# Patient Record
Sex: MALE | Race: WHITE | NOT HISPANIC OR LATINO | Employment: UNEMPLOYED | URBAN - METROPOLITAN AREA
[De-identification: names, ages, dates, MRNs, and addresses within clinical notes are randomized per-mention and may not be internally consistent; named-entity substitution may affect disease eponyms.]

---

## 2022-11-07 ENCOUNTER — HOSPITAL ENCOUNTER (EMERGENCY)
Facility: CLINIC | Age: 47
Discharge: HOME | End: 2022-11-07
Attending: EMERGENCY MEDICINE | Admitting: EMERGENCY MEDICINE
Payer: COMMERCIAL

## 2022-11-07 ENCOUNTER — APPOINTMENT (OUTPATIENT)
Dept: GENERAL RADIOLOGY | Facility: CLINIC | Age: 47
End: 2022-11-07
Attending: EMERGENCY MEDICINE
Payer: COMMERCIAL

## 2022-11-07 VITALS
TEMPERATURE: 98.8 F | RESPIRATION RATE: 20 BRPM | DIASTOLIC BLOOD PRESSURE: 93 MMHG | OXYGEN SATURATION: 96 % | HEART RATE: 64 BPM | WEIGHT: 262.13 LBS | SYSTOLIC BLOOD PRESSURE: 135 MMHG

## 2022-11-07 DIAGNOSIS — R07.9 CHEST PAIN, UNSPECIFIED TYPE: ICD-10-CM

## 2022-11-07 LAB
ANION GAP SERPL CALCULATED.3IONS-SCNC: 9 MMOL/L (ref 7–15)
BASOPHILS # BLD AUTO: 0.1 10E3/UL (ref 0–0.2)
BASOPHILS NFR BLD AUTO: 1 %
BUN SERPL-MCNC: 17.7 MG/DL (ref 6–20)
CALCIUM SERPL-MCNC: 9.2 MG/DL (ref 8.6–10)
CHLORIDE SERPL-SCNC: 105 MMOL/L (ref 98–107)
CREAT SERPL-MCNC: 0.98 MG/DL (ref 0.67–1.17)
DEPRECATED HCO3 PLAS-SCNC: 25 MMOL/L (ref 22–29)
EOSINOPHIL # BLD AUTO: 0.1 10E3/UL (ref 0–0.7)
EOSINOPHIL NFR BLD AUTO: 2 %
ERYTHROCYTE [DISTWIDTH] IN BLOOD BY AUTOMATED COUNT: 13.4 % (ref 10–15)
GFR SERPL CREATININE-BSD FRML MDRD: >90 ML/MIN/1.73M2
GLUCOSE SERPL-MCNC: 89 MG/DL (ref 70–99)
HCT VFR BLD AUTO: 52.1 % (ref 40–53)
HGB BLD-MCNC: 17.2 G/DL (ref 13.3–17.7)
HOLD SPECIMEN: NORMAL
HOLD SPECIMEN: NORMAL
IMM GRANULOCYTES # BLD: 0 10E3/UL
IMM GRANULOCYTES NFR BLD: 0 %
LYMPHOCYTES # BLD AUTO: 2.3 10E3/UL (ref 0.8–5.3)
LYMPHOCYTES NFR BLD AUTO: 30 %
MCH RBC QN AUTO: 28.3 PG (ref 26.5–33)
MCHC RBC AUTO-ENTMCNC: 33 G/DL (ref 31.5–36.5)
MCV RBC AUTO: 86 FL (ref 78–100)
MONOCYTES # BLD AUTO: 0.8 10E3/UL (ref 0–1.3)
MONOCYTES NFR BLD AUTO: 10 %
NEUTROPHILS # BLD AUTO: 4.5 10E3/UL (ref 1.6–8.3)
NEUTROPHILS NFR BLD AUTO: 57 %
NRBC # BLD AUTO: 0 10E3/UL
NRBC BLD AUTO-RTO: 0 /100
PLATELET # BLD AUTO: 237 10E3/UL (ref 150–450)
POTASSIUM SERPL-SCNC: 4.3 MMOL/L (ref 3.4–5.3)
RBC # BLD AUTO: 6.07 10E6/UL (ref 4.4–5.9)
SODIUM SERPL-SCNC: 139 MMOL/L (ref 136–145)
TROPONIN T SERPL HS-MCNC: <6 NG/L
WBC # BLD AUTO: 7.8 10E3/UL (ref 4–11)

## 2022-11-07 PROCEDURE — 250N000009 HC RX 250: Performed by: EMERGENCY MEDICINE

## 2022-11-07 PROCEDURE — 85025 COMPLETE CBC W/AUTO DIFF WBC: CPT | Performed by: EMERGENCY MEDICINE

## 2022-11-07 PROCEDURE — 250N000013 HC RX MED GY IP 250 OP 250 PS 637: Performed by: EMERGENCY MEDICINE

## 2022-11-07 PROCEDURE — 93005 ELECTROCARDIOGRAM TRACING: CPT

## 2022-11-07 PROCEDURE — 80048 BASIC METABOLIC PNL TOTAL CA: CPT | Performed by: EMERGENCY MEDICINE

## 2022-11-07 PROCEDURE — 99285 EMERGENCY DEPT VISIT HI MDM: CPT | Mod: 25

## 2022-11-07 PROCEDURE — 84484 ASSAY OF TROPONIN QUANT: CPT | Performed by: EMERGENCY MEDICINE

## 2022-11-07 PROCEDURE — 71046 X-RAY EXAM CHEST 2 VIEWS: CPT

## 2022-11-07 PROCEDURE — 36415 COLL VENOUS BLD VENIPUNCTURE: CPT | Performed by: EMERGENCY MEDICINE

## 2022-11-07 RX ADMIN — LIDOCAINE HYDROCHLORIDE 30 ML: 20 SOLUTION ORAL; TOPICAL at 13:08

## 2022-11-07 ASSESSMENT — ENCOUNTER SYMPTOMS
NERVOUS/ANXIOUS: 1
VOMITING: 0
DIAPHORESIS: 1
LIGHT-HEADEDNESS: 1
CHEST TIGHTNESS: 1
DIARRHEA: 0
NAUSEA: 0
FEVER: 0
SHORTNESS OF BREATH: 1

## 2022-11-07 ASSESSMENT — ACTIVITIES OF DAILY LIVING (ADL): ADLS_ACUITY_SCORE: 35

## 2022-11-07 NOTE — ED PROVIDER NOTES
History   Chief Complaint:  Chest Pain       The history is provided by the patient.      Jose Tovar is a 46 year old male who presents with chest tightness for the past week with some associated shortness of breath and lightheadedness. He says the pain is intermittent. He claims that he has never felt this sensation before so he was worried about what was going on. He says that he gets tingling in the left arm that seems to come with the intermittent chest tightness episodes. Says that he has been pretty lazy the last week because he gets some shortness of breath with exertion, however is usually very active. Reports some diaphoresis. Reports his wife was in the hospital over the weekend for a skin infection and was curious if this could have spread to him somehow. No history of diabetes, cholesterol, or Hypertension. No tobacco use. No recent travel or lower extremity edema. He feels that cold water makes his pain better and says that many people in his family have GERD, but he just wanted to be sure it wasn't something worse. He feels highly anxious about his symptoms. The patient denies, nausea, vomiting, diarrhea, or fever.     Review of Systems   Constitutional: Positive for diaphoresis. Negative for fever.   Respiratory: Positive for chest tightness and shortness of breath.    Cardiovascular: Negative for leg swelling.   Gastrointestinal: Negative for diarrhea, nausea and vomiting.   Neurological: Positive for light-headedness.   Psychiatric/Behavioral: The patient is nervous/anxious.    All other systems reviewed and are negative.    Allergies:  Bees     Medications:  Epipen     Past Medical History:     Hemoptysis     Past Surgical History:    Vasectomy    Family History:    Prostate cancer  Ovarian cancer    Social History:  The patient presents to the ED alone.     Physical Exam     Patient Vitals for the past 24 hrs:   BP Temp Temp src Pulse Resp SpO2 Weight   11/07/22 1400 (!) 134/92 -- -- 67 --  98 % --   11/07/22 1345 118/84 -- -- 68 -- 97 % --   11/07/22 1115 -- -- -- -- -- -- 118.9 kg (262 lb 2 oz)   11/07/22 1114 (!) 162/115 98.8  F (37.1  C) Temporal 97 20 98 % --   11/07/22 1113 -- 98.4  F (36.9  C) Temporal -- 18 -- --     Physical Exam  General: Alert, no acute distress  HEENT:  Moist mucous membranes.  Conjunctiva normal.  CV:  RRR, no m/r/g, skin warm and well perfused  Pulm:  CTAB, no wheezes/ronchi/rales.  No acute distress, breathing comfortably  GI:  Soft, nontender, nondistended.  No rebound or guarding.    MSK:  Moving all extremities.  No focal areas of edema, erythema  Skin:  WWP, no rashes, no lower extremity edema, skin color normal, no diaphoresis  Psych:  Well-appearing, normal affect, regular speech    Emergency Department Course   ECG  ECG results from 11/07/22   EKG 12 lead     Value    Systolic Blood Pressure     Diastolic Blood Pressure     Ventricular Rate 85    Atrial Rate 85    MN Interval 138    QRS Duration 96        QTc 452    P Axis 55    R AXIS 32    T Axis 29    Interpretation ECG      Sinus rhythm  Inferior infarct , age undetermined  Abnormal ECG  No previous ECGs available         Imaging:  Chest XR,  PA & LAT   Final Result   IMPRESSION: Negative chest.      CEM BROOKS MD            SYSTEM ID:  [id number]        Report per radiology    Laboratory:  Labs Ordered and Resulted from Time of ED Arrival to Time of ED Departure   CBC WITH PLATELETS AND DIFFERENTIAL - Abnormal       Result Value    WBC Count 7.8      RBC Count 6.07 (*)     Hemoglobin 17.2      Hematocrit 52.1      MCV 86      MCH 28.3      MCHC 33.0      RDW 13.4      Platelet Count 237      % Neutrophils 57      % Lymphocytes 30      % Monocytes 10      % Eosinophils 2      % Basophils 1      % Immature Granulocytes 0      NRBCs per 100 WBC 0      Absolute Neutrophils 4.5      Absolute Lymphocytes 2.3      Absolute Monocytes 0.8      Absolute Eosinophils 0.1      Absolute Basophils 0.1       Absolute Immature Granulocytes 0.0      Absolute NRBCs 0.0     BASIC METABOLIC PANEL - Normal    Sodium 139      Potassium 4.3      Chloride 105      Carbon Dioxide (CO2) 25      Anion Gap 9      Urea Nitrogen 17.7      Creatinine 0.98      Calcium 9.2      Glucose 89      GFR Estimate >90     TROPONIN T, HIGH SENSITIVITY - Normal    Troponin T, High Sensitivity <6        Emergency Department Course:    Reviewed:  I reviewed nursing notes, vitals, past medical history and Care Everywhere    Assessments:  1419 I obtained history and examined the patient as noted above.     Interventions:  Medications   lidocaine (viscous) (XYLOCAINE) 2 % 15 mL, alum & mag hydroxide-simethicone (MAALOX) 15 mL GI Cocktail (30 mLs Oral Given 22 1308)     Disposition:  The patient was discharged to home.     Impression & Plan     Medical Decision Makin-year-old female presenting to the ER for evaluation of intermittent chest tightness for the last week.  Please above for detailed HPI and exam.  Patient arrives afebrile vitally stable.  Broad differentials considered including ACS, PE, acute or dissection, pneumothorax, pneumonia, pleural effusion, GI etiology, musculoskeletal etiology amongst other things.  Fortunately work-up here in the ER is reassuring.  EKG shows no signs of acute ischemic changes or signs of pericarditis.  High-sensitivity troponin is undetectable ruling out ACS.  Patient's HEART score is low.  Likewise, patient is low risk for PE and is PERC negative making this unlikely.  Chest x-ray shows no acute findings.  Doubt acute or dissection based on history exam.  The rest of the patient's lab studies are unremarkable.  Patient did have some relief with GI cocktail suggestive of possible GI etiology such as esophagitis, gastritis, GERD as cause for symptoms.  Given his overall reassuring work-up and improvement of symptoms, I do feel that he is safe to discharge home.  He will follow-up closely with PCP as he  has an appointment next week.  Discussed reasons to return to the ER.  All questions answered prior to discharge    Diagnosis:    ICD-10-CM    1. Chest pain, unspecified type  R07.9           Scribe Disclosure:  I, Alvaro Medeiros, am serving as a scribe at 2:19 PM on 11/7/2022 to document services personally performed by Kevin Farah MD based on my observations and the provider's statements to me.              Kevin Farah MD  11/07/22 2978

## 2022-11-07 NOTE — ED NOTES
Rapid Assessment Note    History:   Jose Tovar is a 46 year old male who presents with chest tightness for the past week some associated shortness of breath and lightheadedness. He says the pain is intermittent. He says that he has never felt this sensation before so he was worried about what was going on. He says that he gets tingling in the left arm that seems to come with the intermittent chest tightness episodes. Says that he has been pretty lazy the last week because he gets some shortness of breath, however is usually very active. Reports some diaphoresis. Says his wife was in the hospital over the weekend for a skin infection. No history of diabetes, cholesterol, Hypertension. No tobacco use. No recent travel or lower extremity edema. He feels that cold water makes his pain better and says that many people in his family have GERD, but he just wanted to be sure it wasn't something worse. He feels highly anxious about his symptoms.     Exam:   General:  Alert, interactive  Cardiovascular:  Well perfused; RRR, no m/r/g  Lungs:  No respiratory distress, no accessory muscle use; CTAB, no wheezing/ronchi/rales.   Neuro:  Moving all 4 extremities  Skin:  Warm, dry  Psych:  Normal affect      Plan of Care:   I evaluated the patient and developed an initial plan of care. I discussed this plan and explained that I, or one of my partners, would be returning to complete the evaluation.     46-year-old male presenting to the ER for evaluation of substernal intermittent chest tightness for the last week.  No aggravating or relieving factors.  No prior history of CAD.  Low risk for PE and is PERC negative.  EKG, labs, chest x-ray ordered.    I, Alvaro Medeiros, am serving as a scribe to document services personally performed by Kevin Farah MD , based on my observations and the provider's statements to me.    11/7/2022  [facility] [employer] [facility]    Portions of this medical record were  completed by a scribe. UPON MY REVIEW AND AUTHENTICATION BY ELECTRONIC SIGNATURE, this confirms (a) I performed the applicable clinical services, and (b) the record is accurate.        Kevin Farah MD  11/07/22 0974

## 2022-11-07 NOTE — ED TRIAGE NOTES
Patient c/o chest pain, tightness in chest and lightheadedness. Has been going on for the past week. Pain comes and goes. States every once in a while left arm goes numb with tingling on left side of face. Rates chest pain 2/10 right now. Worsens at night. Denies any cardiac hx.  ABCs intact.

## 2022-11-07 NOTE — DISCHARGE INSTRUCTIONS
Discharge Instructions  Chest Pain    You have been seen today for chest pain or discomfort.  At this time, your provider has found no signs that your chest pain is due to a serious or life-threatening condition, (or you have declined more testing and/or admission to the hospital). However, sometimes there is a serious problem that does not show up right away. Your evaluation today may not be complete and you may need further testing and evaluation.     Generally, every Emergency Department visit should have a follow-up clinic visit with either a primary or a specialty clinic/provider. Please follow-up as instructed by your emergency provider today.  Return to the Emergency Department if:  Your chest pain changes, gets worse, starts to happen more often, or comes with less activity.  You are newly short of breath.  You get very weak or tired.  You pass out or faint.  You have any new symptoms, like fever, cough, numb legs, or you cough up blood.  You have anything else that worries you.    Until you follow-up with your regular provider, please do the following:  If a stress test appointment has been made, go to the appointment.  If you have questions, contact your regular provider.  Follow-up with your regular provider/clinic as directed; this is very important.    If you were given a prescription for medicine here today, be sure to read all of the information (including the package insert) that comes with your prescription.  This will include important information about the medicine, its side effects, and any warnings that you need to know about.  The pharmacist who fills the prescription can provide more information and answer questions you may have about the medicine.  If you have questions or concerns that the pharmacist cannot address, please call or return to the Emergency Department.       Remember that you can always come back to the Emergency Department if you are not able to see your regular provider in  the amount of time listed above, if you get any new symptoms, or if there is anything that worries you.

## 2022-11-08 LAB
ATRIAL RATE - MUSE: 85 BPM
DIASTOLIC BLOOD PRESSURE - MUSE: NORMAL MMHG
INTERPRETATION ECG - MUSE: NORMAL
P AXIS - MUSE: 55 DEGREES
PR INTERVAL - MUSE: 138 MS
QRS DURATION - MUSE: 96 MS
QT - MUSE: 380 MS
QTC - MUSE: 452 MS
R AXIS - MUSE: 32 DEGREES
SYSTOLIC BLOOD PRESSURE - MUSE: NORMAL MMHG
T AXIS - MUSE: 29 DEGREES
VENTRICULAR RATE- MUSE: 85 BPM

## 2023-02-11 ENCOUNTER — HEALTH MAINTENANCE LETTER (OUTPATIENT)
Age: 48
End: 2023-02-11

## 2024-03-09 ENCOUNTER — HEALTH MAINTENANCE LETTER (OUTPATIENT)
Age: 49
End: 2024-03-09

## 2024-05-16 ENCOUNTER — HOSPITAL ENCOUNTER (EMERGENCY)
Facility: CLINIC | Age: 49
Discharge: HOME | End: 2024-05-16
Attending: EMERGENCY MEDICINE | Admitting: EMERGENCY MEDICINE
Payer: COMMERCIAL

## 2024-05-16 ENCOUNTER — APPOINTMENT (OUTPATIENT)
Dept: ULTRASOUND IMAGING | Facility: CLINIC | Age: 49
End: 2024-05-16
Attending: EMERGENCY MEDICINE
Payer: COMMERCIAL

## 2024-05-16 VITALS
SYSTOLIC BLOOD PRESSURE: 142 MMHG | DIASTOLIC BLOOD PRESSURE: 106 MMHG | WEIGHT: 262.35 LBS | HEIGHT: 74 IN | RESPIRATION RATE: 18 BRPM | OXYGEN SATURATION: 96 % | TEMPERATURE: 97.4 F | BODY MASS INDEX: 33.67 KG/M2 | HEART RATE: 86 BPM

## 2024-05-16 DIAGNOSIS — R10.13 EPIGASTRIC PAIN: ICD-10-CM

## 2024-05-16 DIAGNOSIS — K80.20 SYMPTOMATIC CHOLELITHIASIS: ICD-10-CM

## 2024-05-16 LAB
ALBUMIN SERPL BCG-MCNC: 4.6 G/DL (ref 3.5–5.2)
ALBUMIN UR-MCNC: NEGATIVE MG/DL
ALP SERPL-CCNC: 107 U/L (ref 40–150)
ALT SERPL W P-5'-P-CCNC: 43 U/L (ref 0–70)
ANION GAP SERPL CALCULATED.3IONS-SCNC: 15 MMOL/L (ref 7–15)
APPEARANCE UR: CLEAR
AST SERPL W P-5'-P-CCNC: 34 U/L (ref 0–45)
BASOPHILS # BLD AUTO: 0 10E3/UL (ref 0–0.2)
BASOPHILS NFR BLD AUTO: 0 %
BILIRUB SERPL-MCNC: 0.5 MG/DL
BILIRUB UR QL STRIP: NEGATIVE
BUN SERPL-MCNC: 17.3 MG/DL (ref 6–20)
CALCIUM SERPL-MCNC: 8.9 MG/DL (ref 8.6–10)
CHLORIDE SERPL-SCNC: 100 MMOL/L (ref 98–107)
COLOR UR AUTO: ABNORMAL
CREAT SERPL-MCNC: 0.92 MG/DL (ref 0.67–1.17)
DEPRECATED HCO3 PLAS-SCNC: 21 MMOL/L (ref 22–29)
EGFRCR SERPLBLD CKD-EPI 2021: >90 ML/MIN/1.73M2
EOSINOPHIL # BLD AUTO: 0 10E3/UL (ref 0–0.7)
EOSINOPHIL NFR BLD AUTO: 0 %
ERYTHROCYTE [DISTWIDTH] IN BLOOD BY AUTOMATED COUNT: 13.1 % (ref 10–15)
GLUCOSE SERPL-MCNC: 152 MG/DL (ref 70–99)
GLUCOSE UR STRIP-MCNC: NEGATIVE MG/DL
HCT VFR BLD AUTO: 49.5 % (ref 40–53)
HGB BLD-MCNC: 17 G/DL (ref 13.3–17.7)
HGB UR QL STRIP: NEGATIVE
HOLD SPECIMEN: NORMAL
IMM GRANULOCYTES # BLD: 0.1 10E3/UL
IMM GRANULOCYTES NFR BLD: 1 %
KETONES UR STRIP-MCNC: NEGATIVE MG/DL
LEUKOCYTE ESTERASE UR QL STRIP: ABNORMAL
LIPASE SERPL-CCNC: 23 U/L (ref 13–60)
LYMPHOCYTES # BLD AUTO: 1.2 10E3/UL (ref 0.8–5.3)
LYMPHOCYTES NFR BLD AUTO: 9 %
MCH RBC QN AUTO: 28.8 PG (ref 26.5–33)
MCHC RBC AUTO-ENTMCNC: 34.3 G/DL (ref 31.5–36.5)
MCV RBC AUTO: 84 FL (ref 78–100)
MONOCYTES # BLD AUTO: 0.4 10E3/UL (ref 0–1.3)
MONOCYTES NFR BLD AUTO: 3 %
MUCOUS THREADS #/AREA URNS LPF: PRESENT /LPF
NEUTROPHILS # BLD AUTO: 11.7 10E3/UL (ref 1.6–8.3)
NEUTROPHILS NFR BLD AUTO: 87 %
NITRATE UR QL: NEGATIVE
NRBC # BLD AUTO: 0 10E3/UL
NRBC BLD AUTO-RTO: 0 /100
PH UR STRIP: 5.5 [PH] (ref 5–7)
PLATELET # BLD AUTO: 258 10E3/UL (ref 150–450)
POTASSIUM SERPL-SCNC: 4 MMOL/L (ref 3.4–5.3)
PROT SERPL-MCNC: 8.1 G/DL (ref 6.4–8.3)
RBC # BLD AUTO: 5.9 10E6/UL (ref 4.4–5.9)
RBC URINE: 1 /HPF
SODIUM SERPL-SCNC: 136 MMOL/L (ref 135–145)
SP GR UR STRIP: 1.02 (ref 1–1.03)
TROPONIN T SERPL HS-MCNC: <6 NG/L
UROBILINOGEN UR STRIP-MCNC: NORMAL MG/DL
WBC # BLD AUTO: 13.4 10E3/UL (ref 4–11)
WBC URINE: 3 /HPF

## 2024-05-16 PROCEDURE — 250N000011 HC RX IP 250 OP 636: Performed by: EMERGENCY MEDICINE

## 2024-05-16 PROCEDURE — 80053 COMPREHEN METABOLIC PANEL: CPT | Performed by: EMERGENCY MEDICINE

## 2024-05-16 PROCEDURE — 76705 ECHO EXAM OF ABDOMEN: CPT

## 2024-05-16 PROCEDURE — 96374 THER/PROPH/DIAG INJ IV PUSH: CPT

## 2024-05-16 PROCEDURE — 83690 ASSAY OF LIPASE: CPT | Performed by: EMERGENCY MEDICINE

## 2024-05-16 PROCEDURE — 36415 COLL VENOUS BLD VENIPUNCTURE: CPT | Performed by: EMERGENCY MEDICINE

## 2024-05-16 PROCEDURE — 99285 EMERGENCY DEPT VISIT HI MDM: CPT | Mod: 25

## 2024-05-16 PROCEDURE — 81001 URINALYSIS AUTO W/SCOPE: CPT | Performed by: EMERGENCY MEDICINE

## 2024-05-16 PROCEDURE — 84484 ASSAY OF TROPONIN QUANT: CPT | Performed by: EMERGENCY MEDICINE

## 2024-05-16 PROCEDURE — 85049 AUTOMATED PLATELET COUNT: CPT | Performed by: EMERGENCY MEDICINE

## 2024-05-16 RX ORDER — KETOROLAC TROMETHAMINE 15 MG/ML
15 INJECTION, SOLUTION INTRAMUSCULAR; INTRAVENOUS ONCE
Status: COMPLETED | OUTPATIENT
Start: 2024-05-16 | End: 2024-05-16

## 2024-05-16 RX ADMIN — KETOROLAC TROMETHAMINE 15 MG: 15 INJECTION, SOLUTION INTRAMUSCULAR; INTRAVENOUS at 10:15

## 2024-05-16 ASSESSMENT — ACTIVITIES OF DAILY LIVING (ADL)
ADLS_ACUITY_SCORE: 35
ADLS_ACUITY_SCORE: 33
ADLS_ACUITY_SCORE: 35

## 2024-05-16 ASSESSMENT — COLUMBIA-SUICIDE SEVERITY RATING SCALE - C-SSRS
2. HAVE YOU ACTUALLY HAD ANY THOUGHTS OF KILLING YOURSELF IN THE PAST MONTH?: NO
6. HAVE YOU EVER DONE ANYTHING, STARTED TO DO ANYTHING, OR PREPARED TO DO ANYTHING TO END YOUR LIFE?: NO
1. IN THE PAST MONTH, HAVE YOU WISHED YOU WERE DEAD OR WISHED YOU COULD GO TO SLEEP AND NOT WAKE UP?: NO

## 2024-05-16 NOTE — ED PROVIDER NOTES
"  Emergency Department Note      History of Present Illness     Chief Complaint  Abdominal Pain    HPI  Jose Tovar is a 48 year old male with a history of heartburn presenting with severe, constant abdominal pain that onset around midnight. Upon examination, the severity of pain is 10/10. His pain level has increased since presentation when it was 7/10. Patient notes the pain is located in the central abdomen, under the sternum and radiates slightly to the R side. Patient's symptoms worsen with movement and palpation. The pain does not change with liquid or food consumption. Patient tried to relieve his symptoms by vomiting with no relief. Jose reports a normal bowel movement yesterday. Patient endorses diaphoresis, tiredness, and loss of appetite. Denies difficulty urination, back pain, or vomiting. No recent fall, trauma, or change in diet. No history of abdominal surgery. Patient had a history of similar symptoms 4 months ago. His symptoms were isolated to his abdomen, but resolved after about 4 hours. He was not evaluated at this time. Of note, patient has been sober for 22 years. No recent alcohol use.     Independent Historian  None    Review of External Notes  I reviewed the office visit  note from 1/17/24. I reviewed his blood pressure.    Past Medical History   Medical History and Problem List  The patient denies any significant past medical history.     Medications  Omeprazole   Epinephrine  Cyclobenzaprine      Surgical History   Removal of sperm ducts    Physical Exam   Patient Vitals for the past 24 hrs:   BP Temp Temp src Pulse Resp SpO2 Height Weight   05/16/24 0936 (!) 142/106 97.4  F (36.3  C) Oral 86 18 96 % 1.88 m (6' 2\") 119 kg (262 lb 5.6 oz)     Physical Exam  General: The patient is alert, in no respiratory distress.    Cardiovascular: Regular rate and rhythm. Good pulses in all four extremities. Normal capillary refill and skin turgor.     Respiratory: Lungs are clear. No nasal flaring. " No retractions. No wheezing, no crackles.    Gastrointestinal: No guarding, no rebound. No palpable hernias. Epigastric tenderness.    Musculoskeletal: No gross deformity.     Skin: No rashes or petechiae.     Neurologic: The patient is alert and oriented x3. GCS 15. No testable cranial nerve deficit. Follows commands with clear and appropriate speech. Gives appropriate answers. Good strength in all extremities. No gross neurologic deficit. Gross sensation intact. Pupils are round and reactive. No meningismus.     Lymphatic: No cervical adenopathy. No lower extremity swelling.    Psychiatric: The patient is non-tearful.    Diagnostics   Lab Results   Labs Ordered and Resulted from Time of ED Arrival to Time of ED Departure   COMPREHENSIVE METABOLIC PANEL - Abnormal       Result Value    Sodium 136      Potassium 4.0      Carbon Dioxide (CO2) 21 (*)     Anion Gap 15      Urea Nitrogen 17.3      Creatinine 0.92      GFR Estimate >90      Calcium 8.9      Chloride 100      Glucose 152 (*)     Alkaline Phosphatase 107      AST 34      ALT 43      Protein Total 8.1      Albumin 4.6      Bilirubin Total 0.5     CBC WITH PLATELETS AND DIFFERENTIAL - Abnormal    WBC Count 13.4 (*)     RBC Count 5.90      Hemoglobin 17.0      Hematocrit 49.5      MCV 84      MCH 28.8      MCHC 34.3      RDW 13.1      Platelet Count 258      % Neutrophils 87      % Lymphocytes 9      % Monocytes 3      % Eosinophils 0      % Basophils 0      % Immature Granulocytes 1      NRBCs per 100 WBC 0      Absolute Neutrophils 11.7 (*)     Absolute Lymphocytes 1.2      Absolute Monocytes 0.4      Absolute Eosinophils 0.0      Absolute Basophils 0.0      Absolute Immature Granulocytes 0.1      Absolute NRBCs 0.0     ROUTINE UA WITH MICROSCOPIC - Abnormal    Color Urine Light Yellow      Appearance Urine Clear      Glucose Urine Negative      Bilirubin Urine Negative      Ketones Urine Negative      Specific Gravity Urine 1.017      Blood Urine Negative       pH Urine 5.5      Protein Albumin Urine Negative      Urobilinogen Urine Normal      Nitrite Urine Negative      Leukocyte Esterase Urine Trace (*)     Mucus Urine Present (*)     RBC Urine 1      WBC Urine 3     LIPASE - Normal    Lipase 23     TROPONIN T, HIGH SENSITIVITY - Normal    Troponin T, High Sensitivity <6       Imaging  US Abdomen Limited   Preliminary Result   IMPRESSION:   1.  Cholelithiasis. Mild wall thickening of the gallbladder.   Gallbladder sludge also noted. Negative sonographic Connolly sign.   2.  Fatty liver. Hepatomegaly. Liver is partially obscured for   assessment.        Independent Interpretation  None  ED Course    Medications Administered  Medications   ketorolac (TORADOL) injection 15 mg (15 mg Intravenous $Given 5/16/24 1015)     Procedures  Procedures     Discussion of Management  I reviewed the patient's ultrasound and do visualize the gallstones in the gallbladder    Social Determinants of Health adding to complexity of care  None    ED Course  ED Course as of 05/16/24 1527   Thu May 16, 2024   1001 I obtained the history and examined the patient as noted above.      1420 I rechecked and updated the patient.        Medical Decision Making / Diagnosis   CMS Diagnoses: None    MIPS     None    [facility]  Jose Tovar is a 48 year old male who has had 1 previous episode similar to this with severe epigastric pain.  It was quite well localized I did not feel that this was likely due to ACS.  Consideration was given to diverticulitis perforation pancreatitis amongst other causes or peptic ulcer disease I did not feel was likely the case.  With him being tender in this location gallbladder was felt to be the most likely.  We had a discussion about ultrasound in fact his ultrasound does show signs of stones and sludge he does not have signs of cholecystitis his labs are otherwise reassuring his pain is under control.  Discussed the expected follow-up and symptoms he should return for but  I think he will require cholecystectomy.  He was given information about following up with general surgery as an outpatient his blood pressure is elevated likely secondary to pain and he was discharged home in good condition.    Disposition  The patient was discharged.     ICD-10 Codes:    ICD-10-CM    1. Epigastric pain  R10.13       2. Symptomatic cholelithiasis  K80.20            Discharge Medications  There are no discharge medications for this patient.    Scribe Disclosure:  I, Stephanie Rai, am serving as a scribe at 10:12 AM on 5/16/2024 to document services personally performed by Cali Briceno MD based on my observations and the provider's statements to me.        Cali Briceno MD  05/16/24 0595

## 2024-05-20 ENCOUNTER — OFFICE VISIT (OUTPATIENT)
Dept: SURGERY | Facility: CLINIC | Age: 49
End: 2024-05-20
Payer: COMMERCIAL

## 2024-05-20 ENCOUNTER — TELEPHONE (OUTPATIENT)
Dept: SURGERY | Facility: CLINIC | Age: 49
End: 2024-05-20

## 2024-05-20 VITALS
DIASTOLIC BLOOD PRESSURE: 86 MMHG | RESPIRATION RATE: 16 BRPM | OXYGEN SATURATION: 97 % | HEART RATE: 81 BPM | HEIGHT: 74 IN | BODY MASS INDEX: 33.62 KG/M2 | WEIGHT: 262 LBS | SYSTOLIC BLOOD PRESSURE: 128 MMHG

## 2024-05-20 DIAGNOSIS — K80.20 GALLSTONES: Primary | ICD-10-CM

## 2024-05-20 PROCEDURE — 99204 OFFICE O/P NEW MOD 45 MIN: CPT | Performed by: SURGERY

## 2024-05-20 RX ORDER — INDOCYANINE GREEN AND WATER 25 MG
2.5 KIT INJECTION ONCE
Status: CANCELLED | OUTPATIENT
Start: 2024-05-20 | End: 2024-05-20

## 2024-05-20 RX ORDER — KETOCONAZOLE 20 MG/ML
SHAMPOO TOPICAL PRN
COMMUNITY

## 2024-05-20 RX ORDER — EPINEPHRINE 0.3 MG/.3ML
0.3 INJECTION SUBCUTANEOUS
COMMUNITY
Start: 2023-05-12

## 2024-05-20 RX ORDER — CLOBETASOL PROPIONATE 0.5 MG/G
OINTMENT TOPICAL PRN
COMMUNITY
Start: 2023-08-08

## 2024-05-20 NOTE — PROGRESS NOTES
[facility]  New Patient Office Visit      Jose Tovar is a 48 year old male seen in consultation for gallstones at the request of Al Briceno MD       Assessment and Plan:  It is my impression that Jose has symptomatic gallstones.   I have offered him a laparoscopic cholecystectomy.      We have discussed the indication, alternatives, risks and expected recovery.  Specifically we have discussed incisions, scarring, postoperative infections, anesthesia, bleeding, common bile duct injury, injury to intra-abdominal organs, adhesions that can lead to bowel obstruction, retained common bile duct stone, bile leak, DVT, hernia, post cholecystectomy diarrhea, postoperative dietary restrictions and physical limitations.  We have discussed the recommended interventions and treatments for these complications.  All questions have been answered to the best of my ability.           We will schedule surgery at the patient's convenience.                  Chief complaint:  Abdominal pain, epigastric  Abdominal pain, right upper quadrant    HPI:  Jose Tovar is a 48 year old male who presents with his wife Keith for intermittent epigastric pain for several months.  The pain is not associated with eating any type of food.  Negative for associated symptoms of nausea, vomiting, diarrhea, constipation, and fever.  He does not have a history of jaundice or dark urine.     He was seen recently in the ED on 5/16/24 for a flare that lasted many hours and I have reviewed their documentation/notes. Ultrasound was done showing gallstones and gallbladder wall thickening. CMP and lipase was normal, CBC showed wbc 13.4.  Prior to that had a similar episode 4 months previously. This last episode pain radiated to the right upper abdomen.    Followed by gastroenterology, was treated for h pylori in the past, then EGD done last fall for ongoing symptoms which was normal.    He is leaving for a cross country motorcycle trip on June  "22    Past Medical History:  No past medical history on file.    Medications:  Current Outpatient Medications   Medication Sig Dispense Refill    clobetasol (TEMOVATE) 0.05 % external ointment as needed      EPINEPHrine (ANY BX GENERIC EQUIV) 0.3 MG/0.3ML injection 2-pack Inject 0.3 mg into the muscle      ketoconazole (NIZORAL) 2 % external shampoo as needed      omeprazole (PRILOSEC) 20 MG DR capsule daily       No current facility-administered medications for this visit.       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  Sister had gallbladder removed    Review of Systems:  The 10 point review of systems is negative other than noted in the HPI and above.    Physical Exam:  Vitals: /86   Pulse 81   Resp 16   Ht 1.88 m (6' 2\")   Wt 118.8 kg (262 lb)   SpO2 97%   BMI 33.64 kg/m    BMI= Body mass index is 33.64 kg/m .  General - Well developed, well nourished male in no apparent distress  Abdomen: soft, rounded, non-distended with no tenderness noted diffusely. no masses palpated.  Neurologic: alert, speech is clear, nonfocal  Psychiatric: Mood and affect appropriate    Relevant labs:    WBC -   Lab Results   Component Value Date    WBC 13.4 (H) 05/16/2024       HgB -   Lab Results   Component Value Date    HGB 17.0 05/16/2024       Plt-   Lab Results   Component Value Date     05/16/2024       Liver Function Studies -   Recent Labs   Lab Test 05/16/24  0937   PROTTOTAL 8.1   ALBUMIN 4.6   BILITOTAL 0.5   ALKPHOS 107   AST 34   ALT 43       Lipase-   Lab Results   Component Value " Date    LIPASE 23 05/16/2024           Imaging:  All imaging studies reviewed by me.    Ultrasound shows: positive cholelithiasis, positive gallbladder wall thickening, negative ductal dilatation, negative pericholecystic fluid, negative sonographic Connolly's sign.    Recent Results (from the past 744 hour(s))   US Abdomen Limited    Narrative    US ABDOMEN LIMITED 5/16/2024 1:27 PM    CLINICAL HISTORY: Abdominal pain.    TECHNIQUE: Limited abdominal ultrasound.    COMPARISON: None.    FINDINGS:    GALLBLADDER: Cholelithiasis and gallbladder sludge. Negative  sonographic Connolly sign. Thickening of the gallbladder wall measuring  up to 5 mm.    BILE DUCTS: There is no biliary dilatation. The common duct measures  4mm.    LIVER: Partially obscured. Hepatomegaly measuring approximate length  of 17.9 cm. Fatty liver.    RIGHT KIDNEY: No hydronephrosis.    PANCREAS: The pancreas is largely obscured by overlying gas.    No ascites.      Impression    IMPRESSION:  1.  Cholelithiasis. Mild wall thickening of the gallbladder.  Gallbladder sludge also noted. Negative sonographic Connolly sign.  2.  Fatty liver. Hepatomegaly. Liver is partially obscured for  assessment.    MICKY SAXENA MD         SYSTEM ID:  [id number]         This note was created using voice recognition software. Undetected word substitutions or other errors may have occurred.       Chio Davila MD  [facility], [place]    Please route or send letter to:  Referring Provider

## 2024-05-20 NOTE — LETTER
May 20, 2024          RE:   Jose Tovar 1975      Dear Colleague,    Thank you for referring your patient, Jose Tovar, to [facility]. Please see a copy of my visit note below.    [facility]  New Patient Office Visit      Jose Tovar is a 48 year old male seen in consultation for gallstones at the request of Al Briceno MD       Assessment and Plan:  It is my impression that Jose has symptomatic gallstones.   I have offered him a laparoscopic cholecystectomy.      We have discussed the indication, alternatives, risks and expected recovery.  Specifically we have discussed incisions, scarring, postoperative infections, anesthesia, bleeding, common bile duct injury, injury to intra-abdominal organs, adhesions that can lead to bowel obstruction, retained common bile duct stone, bile leak, DVT, hernia, post cholecystectomy diarrhea, postoperative dietary restrictions and physical limitations.  We have discussed the recommended interventions and treatments for these complications.  All questions have been answered to the best of my ability.         We will schedule surgery at the patient's convenience.    Chief complaint:  Abdominal pain, epigastric  Abdominal pain, right upper quadrant    HPI:  Jose Tovar is a 48 year old male who presents with his wife Keith for intermittent epigastric pain for several months.  The pain is not associated with eating any type of food.  Negative for associated symptoms of nausea, vomiting, diarrhea, constipation, and fever.  He does not have a history of jaundice or dark urine.     He was seen recently in the ED on 5/16/24 for a flare that lasted many hours and I have reviewed their documentation/notes. Ultrasound was done showing gallstones and gallbladder wall thickening. CMP and lipase was normal, CBC showed wbc 13.4.  Prior to that had a similar episode 4 months previously. This last episode pain radiated to the  Please schedule her for follow-up. The x-ray suggests multiple potential issues. They need to be correlated clinically with her symptoms but range from possible pneumonia to atelectasis which means that the lung is not fully recruited in breathing.   This can be explained with more detail at her follow-up appointment "right upper abdomen.    Followed by gastroenterology, was treated for h pylori in the past, then EGD done last fall for ongoing symptoms which was normal.    He is leaving for a cross [place] motorcycle trip on June 22    Past Medical History:  No past medical history on file.    Family History:  No family history on file.  Sister had gallbladder removed    Review of Systems:  The 10 point review of systems is negative other than noted in the HPI and above.    Physical Exam:  Vitals: /86   Pulse 81   Resp 16   Ht 1.88 m (6' 2\")   Wt 118.8 kg (262 lb)   SpO2 97%   BMI 33.64 kg/m    BMI= Body mass index is 33.64 kg/m .  General - Well developed, well nourished male in no apparent distress  Abdomen: soft, rounded, non-distended with no tenderness noted diffusely. no masses palpated.  Neurologic: alert, speech is clear, nonfocal  Psychiatric: Mood and affect appropriate    Relevant labs:    WBC -   Lab Results   Component Value Date    WBC 13.4 (H) 05/16/2024       HgB -   Lab Results   Component Value Date    HGB 17.0 05/16/2024       Plt-   Lab Results   Component Value Date     05/16/2024       Liver Function Studies -   Recent Labs   Lab Test 05/16/24  0937   PROTTOTAL 8.1   ALBUMIN 4.6   BILITOTAL 0.5   ALKPHOS 107   AST 34   ALT 43       Lipase-   Lab Results   Component Value Date    LIPASE 23 05/16/2024           Imaging:  All imaging studies reviewed by me.    Ultrasound shows: positive cholelithiasis, positive gallbladder wall thickening, negative ductal dilatation, negative pericholecystic fluid, negative sonographic Connolly's sign.    Recent Results (from the past 744 hour(s))   US Abdomen Limited    Narrative    US ABDOMEN LIMITED 5/16/2024 1:27 PM    CLINICAL HISTORY: Abdominal pain.    TECHNIQUE: Limited abdominal ultrasound.    COMPARISON: None.    FINDINGS:    GALLBLADDER: Cholelithiasis and gallbladder sludge. Negative  sonographic Connolly sign. Thickening of the gallbladder wall " measuring  up to 5 mm.    BILE DUCTS: There is no biliary dilatation. The common duct measures  4mm.    LIVER: Partially obscured. Hepatomegaly measuring approximate length  of 17.9 cm. Fatty liver.    RIGHT KIDNEY: No hydronephrosis.    PANCREAS: The pancreas is largely obscured by overlying gas.    No ascites.      Impression    IMPRESSION:  1.  Cholelithiasis. Mild wall thickening of the gallbladder.  Gallbladder sludge also noted. Negative sonographic Connolly sign.  2.  Fatty liver. Hepatomegaly. Liver is partially obscured for  assessment.    MICKY SAXENA MD         SYSTEM ID:  [id number]       Again, thank you for allowing me to participate in the care of your patient.      Sincerely,      Chio Davila MD

## 2024-05-20 NOTE — TELEPHONE ENCOUNTER
Type of surgery: ROBOTIC ASSISTED CHOLECYSTECTOMY  Location of surgery: [facility]  Date and time of surgery: 5/30/2024  @ 9:20 AM   Surgeon: Chio Davila MD   Pre-Op Appt Date: ER 5/16/2024   Post-Op Appt Date: PATIENT TO SCHEDULE     Packet sent out: Yes  Pre-cert/Authorization completed:  Not Applicable  Date: 5/20/2024        ROBOTIC ASSISTED CHOLECYSTECTOMY GENERAL H&P DONE ER 5/16/24    90 MIN REQ PA ASSIST JLS NMS

## 2024-05-20 NOTE — LETTER
May 20, 2024       Jose Tovar  [address]   [place] [address]     RE: [id number]  : 1975    Jose Tovar has been scheduled for surgery on 2024 at 9:20 AM  at [facility] with Dr Chio Davila.  The hospital is located at [address] in [place].    Please check in at the Surgery reception desk at 7:20 AM . This is located in the back of the hospital on the East side, just past the Emergency Room entrance.     DO NOT EAT OR DRINK ANYTHING 8 HOURS BEFORE YOUR ARRIVAL TIME.   You may have sips of clear liquids up until 2 hours before your arrival time. If you have been advised to take your medication, please do this early in the morning with just sips of clear liquid.     Hospital regulations require an updated pre-operative examination to be completed within 30 days of the procedure. This can be done by your primary care provider. Please ask them to fax documentation to [phone]. We also recommend you bring a copy with you.     You should shower before your surgery with Hibiclens or Exidine soap.  This can be found at your local pharmacy or you can pick it up from our office for free.  Please call our office if you have any questions.     You will be required to have an Adult (friend or family member) drive you home after your surgery and arrange for an adult to stay with you until the next morning.     You will receive several calls from our staff 3-7 days prior to your scheduled procedure with further details and to answer any questions you may have.    It is sometimes necessary to adjust the surgery schedule due to emergencies and additions to the schedule.  If your surgery is affected by this, we greatly appreciate your flexibility and understanding in this matter    It is best if you call regarding post-operative questions between the hours of 8:00 am & 3:00 pm Monday-Friday, so you have access to the daytime care team that know you  best.  Prescription refills are accepted during regular office hours only.    Please do not bring any Disability or FMLA papers to the hospital.  They need to be either faxed ([phone]), mailed or hand delivered to our office by you or a family member for completion.  Please allow 14 business days to complete paperwork.        If you have questions or concerns, please contact our office at [phone].

## 2024-05-30 ENCOUNTER — ANESTHESIA EVENT (OUTPATIENT)
Dept: SURGERY | Facility: CLINIC | Age: 49
End: 2024-05-30
Payer: COMMERCIAL

## 2024-05-30 ENCOUNTER — APPOINTMENT (OUTPATIENT)
Dept: SURGERY | Facility: PHYSICIAN GROUP | Age: 49
End: 2024-05-30
Payer: COMMERCIAL

## 2024-05-30 ENCOUNTER — HOSPITAL ENCOUNTER (OUTPATIENT)
Facility: CLINIC | Age: 49
Discharge: HOME | End: 2024-05-30
Attending: SURGERY | Admitting: SURGERY
Payer: COMMERCIAL

## 2024-05-30 ENCOUNTER — ANESTHESIA (OUTPATIENT)
Dept: SURGERY | Facility: CLINIC | Age: 49
End: 2024-05-30
Payer: COMMERCIAL

## 2024-05-30 VITALS
DIASTOLIC BLOOD PRESSURE: 88 MMHG | SYSTOLIC BLOOD PRESSURE: 129 MMHG | HEART RATE: 94 BPM | TEMPERATURE: 98 F | WEIGHT: 256.2 LBS | OXYGEN SATURATION: 92 % | RESPIRATION RATE: 16 BRPM | BODY MASS INDEX: 32.89 KG/M2

## 2024-05-30 DIAGNOSIS — K80.20 GALLSTONES: ICD-10-CM

## 2024-05-30 PROCEDURE — 250N000009 HC RX 250: Performed by: NURSE ANESTHETIST, CERTIFIED REGISTERED

## 2024-05-30 PROCEDURE — 47562 LAPAROSCOPIC CHOLECYSTECTOMY: CPT | Mod: AS | Performed by: PHYSICIAN ASSISTANT

## 2024-05-30 PROCEDURE — 710N000009 HC RECOVERY PHASE 1, LEVEL 1, PER MIN: Performed by: SURGERY

## 2024-05-30 PROCEDURE — S2900 ROBOTIC SURGICAL SYSTEM: HCPCS | Performed by: SURGERY

## 2024-05-30 PROCEDURE — 272N000001 HC OR GENERAL SUPPLY STERILE: Performed by: SURGERY

## 2024-05-30 PROCEDURE — 88304 TISSUE EXAM BY PATHOLOGIST: CPT | Mod: 26 | Performed by: PATHOLOGY

## 2024-05-30 PROCEDURE — 710N000012 HC RECOVERY PHASE 2, PER MINUTE: Performed by: SURGERY

## 2024-05-30 PROCEDURE — 250N000009 HC RX 250: Performed by: SURGERY

## 2024-05-30 PROCEDURE — 258N000003 HC RX IP 258 OP 636: Performed by: ANESTHESIOLOGY

## 2024-05-30 PROCEDURE — 250N000025 HC SEVOFLURANE, PER MIN: Performed by: SURGERY

## 2024-05-30 PROCEDURE — 47562 LAPAROSCOPIC CHOLECYSTECTOMY: CPT | Performed by: SURGERY

## 2024-05-30 PROCEDURE — 370N000017 HC ANESTHESIA TECHNICAL FEE, PER MIN: Performed by: SURGERY

## 2024-05-30 PROCEDURE — 250N000011 HC RX IP 250 OP 636: Performed by: ANESTHESIOLOGY

## 2024-05-30 PROCEDURE — 258N000003 HC RX IP 258 OP 636: Performed by: NURSE ANESTHETIST, CERTIFIED REGISTERED

## 2024-05-30 PROCEDURE — 250N000013 HC RX MED GY IP 250 OP 250 PS 637: Performed by: SURGERY

## 2024-05-30 PROCEDURE — 250N000011 HC RX IP 250 OP 636: Performed by: NURSE ANESTHETIST, CERTIFIED REGISTERED

## 2024-05-30 PROCEDURE — 250N000013 HC RX MED GY IP 250 OP 250 PS 637: Performed by: ANESTHESIOLOGY

## 2024-05-30 PROCEDURE — 250N000011 HC RX IP 250 OP 636: Performed by: SURGERY

## 2024-05-30 PROCEDURE — 360N000080 HC SURGERY LEVEL 7, PER MIN: Performed by: SURGERY

## 2024-05-30 PROCEDURE — 999N000141 HC STATISTIC PRE-PROCEDURE NURSING ASSESSMENT: Performed by: SURGERY

## 2024-05-30 PROCEDURE — 88304 TISSUE EXAM BY PATHOLOGIST: CPT | Mod: TC | Performed by: SURGERY

## 2024-05-30 RX ORDER — HYDROMORPHONE HCL IN WATER/PF 6 MG/30 ML
0.2 PATIENT CONTROLLED ANALGESIA SYRINGE INTRAVENOUS EVERY 5 MIN PRN
Status: DISCONTINUED | OUTPATIENT
Start: 2024-05-30 | End: 2024-05-30 | Stop reason: HOSPADM

## 2024-05-30 RX ORDER — LIDOCAINE 40 MG/G
CREAM TOPICAL
Status: DISCONTINUED | OUTPATIENT
Start: 2024-05-30 | End: 2024-05-30 | Stop reason: HOSPADM

## 2024-05-30 RX ORDER — SODIUM CHLORIDE, SODIUM LACTATE, POTASSIUM CHLORIDE, CALCIUM CHLORIDE 600; 310; 30; 20 MG/100ML; MG/100ML; MG/100ML; MG/100ML
INJECTION, SOLUTION INTRAVENOUS CONTINUOUS
Status: DISCONTINUED | OUTPATIENT
Start: 2024-05-30 | End: 2024-05-30 | Stop reason: HOSPADM

## 2024-05-30 RX ORDER — HYDROMORPHONE HCL IN WATER/PF 6 MG/30 ML
0.4 PATIENT CONTROLLED ANALGESIA SYRINGE INTRAVENOUS EVERY 5 MIN PRN
Status: DISCONTINUED | OUTPATIENT
Start: 2024-05-30 | End: 2024-05-30 | Stop reason: HOSPADM

## 2024-05-30 RX ORDER — OXYCODONE HYDROCHLORIDE 5 MG/1
5-10 TABLET ORAL EVERY 4 HOURS PRN
Qty: 10 TABLET | Refills: 0 | Status: SHIPPED | OUTPATIENT
Start: 2024-05-30 | End: 2024-06-11

## 2024-05-30 RX ORDER — DEXAMETHASONE SODIUM PHOSPHATE 4 MG/ML
4 INJECTION, SOLUTION INTRA-ARTICULAR; INTRALESIONAL; INTRAMUSCULAR; INTRAVENOUS; SOFT TISSUE
Status: DISCONTINUED | OUTPATIENT
Start: 2024-05-30 | End: 2024-05-30 | Stop reason: HOSPADM

## 2024-05-30 RX ORDER — NALOXONE HYDROCHLORIDE 0.4 MG/ML
0.1 INJECTION, SOLUTION INTRAMUSCULAR; INTRAVENOUS; SUBCUTANEOUS
Status: DISCONTINUED | OUTPATIENT
Start: 2024-05-30 | End: 2024-05-30 | Stop reason: HOSPADM

## 2024-05-30 RX ORDER — DIPHENHYDRAMINE HYDROCHLORIDE 50 MG/ML
25 INJECTION INTRAMUSCULAR; INTRAVENOUS EVERY 6 HOURS PRN
Status: DISCONTINUED | OUTPATIENT
Start: 2024-05-30 | End: 2024-05-30 | Stop reason: HOSPADM

## 2024-05-30 RX ORDER — DEXAMETHASONE SODIUM PHOSPHATE 4 MG/ML
INJECTION, SOLUTION INTRA-ARTICULAR; INTRALESIONAL; INTRAMUSCULAR; INTRAVENOUS; SOFT TISSUE PRN
Status: DISCONTINUED | OUTPATIENT
Start: 2024-05-30 | End: 2024-05-30

## 2024-05-30 RX ORDER — FENTANYL CITRATE 50 UG/ML
50 INJECTION, SOLUTION INTRAMUSCULAR; INTRAVENOUS EVERY 5 MIN PRN
Status: DISCONTINUED | OUTPATIENT
Start: 2024-05-30 | End: 2024-05-30 | Stop reason: HOSPADM

## 2024-05-30 RX ORDER — ACETAMINOPHEN 325 MG/1
975 TABLET ORAL ONCE
Status: COMPLETED | OUTPATIENT
Start: 2024-05-30 | End: 2024-05-30

## 2024-05-30 RX ORDER — OXYCODONE HYDROCHLORIDE 5 MG/1
5 TABLET ORAL
Status: COMPLETED | OUTPATIENT
Start: 2024-05-30 | End: 2024-05-30

## 2024-05-30 RX ORDER — CEFAZOLIN SODIUM/WATER 2 G/20 ML
2 SYRINGE (ML) INTRAVENOUS SEE ADMIN INSTRUCTIONS
Status: DISCONTINUED | OUTPATIENT
Start: 2024-05-30 | End: 2024-05-30 | Stop reason: HOSPADM

## 2024-05-30 RX ORDER — ONDANSETRON 4 MG/1
4 TABLET, ORALLY DISINTEGRATING ORAL EVERY 30 MIN PRN
Status: DISCONTINUED | OUTPATIENT
Start: 2024-05-30 | End: 2024-05-30 | Stop reason: HOSPADM

## 2024-05-30 RX ORDER — OXYCODONE HYDROCHLORIDE 5 MG/1
5 TABLET ORAL
Status: DISCONTINUED | OUTPATIENT
Start: 2024-05-30 | End: 2024-05-30 | Stop reason: HOSPADM

## 2024-05-30 RX ORDER — BUPIVACAINE HYDROCHLORIDE 5 MG/ML
INJECTION, SOLUTION EPIDURAL; INTRACAUDAL PRN
Status: DISCONTINUED | OUTPATIENT
Start: 2024-05-30 | End: 2024-05-30 | Stop reason: HOSPADM

## 2024-05-30 RX ORDER — KETOROLAC TROMETHAMINE 30 MG/ML
INJECTION, SOLUTION INTRAMUSCULAR; INTRAVENOUS PRN
Status: DISCONTINUED | OUTPATIENT
Start: 2024-05-30 | End: 2024-05-30

## 2024-05-30 RX ORDER — PROPOFOL 10 MG/ML
INJECTION, EMULSION INTRAVENOUS PRN
Status: DISCONTINUED | OUTPATIENT
Start: 2024-05-30 | End: 2024-05-30

## 2024-05-30 RX ORDER — DIAZEPAM 10 MG/2ML
2.5 INJECTION, SOLUTION INTRAMUSCULAR; INTRAVENOUS
Status: DISCONTINUED | OUTPATIENT
Start: 2024-05-30 | End: 2024-05-30 | Stop reason: HOSPADM

## 2024-05-30 RX ORDER — CEFAZOLIN SODIUM/WATER 2 G/20 ML
2 SYRINGE (ML) INTRAVENOUS
Status: COMPLETED | OUTPATIENT
Start: 2024-05-30 | End: 2024-05-30

## 2024-05-30 RX ORDER — FENTANYL CITRATE 50 UG/ML
25 INJECTION, SOLUTION INTRAMUSCULAR; INTRAVENOUS EVERY 5 MIN PRN
Status: DISCONTINUED | OUTPATIENT
Start: 2024-05-30 | End: 2024-05-30 | Stop reason: HOSPADM

## 2024-05-30 RX ORDER — ALBUTEROL SULFATE 0.83 MG/ML
2.5 SOLUTION RESPIRATORY (INHALATION) EVERY 4 HOURS PRN
Status: DISCONTINUED | OUTPATIENT
Start: 2024-05-30 | End: 2024-05-30 | Stop reason: HOSPADM

## 2024-05-30 RX ORDER — HYDRALAZINE HYDROCHLORIDE 20 MG/ML
2.5-5 INJECTION INTRAMUSCULAR; INTRAVENOUS EVERY 10 MIN PRN
Status: DISCONTINUED | OUTPATIENT
Start: 2024-05-30 | End: 2024-05-30 | Stop reason: HOSPADM

## 2024-05-30 RX ORDER — LIDOCAINE HYDROCHLORIDE 20 MG/ML
INJECTION, SOLUTION INFILTRATION; PERINEURAL PRN
Status: DISCONTINUED | OUTPATIENT
Start: 2024-05-30 | End: 2024-05-30

## 2024-05-30 RX ORDER — PROPOFOL 10 MG/ML
INJECTION, EMULSION INTRAVENOUS CONTINUOUS PRN
Status: DISCONTINUED | OUTPATIENT
Start: 2024-05-30 | End: 2024-05-30

## 2024-05-30 RX ORDER — FENTANYL CITRATE 50 UG/ML
INJECTION, SOLUTION INTRAMUSCULAR; INTRAVENOUS PRN
Status: DISCONTINUED | OUTPATIENT
Start: 2024-05-30 | End: 2024-05-30

## 2024-05-30 RX ORDER — ONDANSETRON 2 MG/ML
INJECTION INTRAMUSCULAR; INTRAVENOUS PRN
Status: DISCONTINUED | OUTPATIENT
Start: 2024-05-30 | End: 2024-05-30

## 2024-05-30 RX ORDER — INDOCYANINE GREEN AND WATER 25 MG
2.5 KIT INJECTION ONCE
Status: COMPLETED | OUTPATIENT
Start: 2024-05-30 | End: 2024-05-30

## 2024-05-30 RX ORDER — DIPHENHYDRAMINE HCL 25 MG
25 CAPSULE ORAL EVERY 6 HOURS PRN
Status: DISCONTINUED | OUTPATIENT
Start: 2024-05-30 | End: 2024-05-30 | Stop reason: HOSPADM

## 2024-05-30 RX ORDER — ONDANSETRON 4 MG/1
4 TABLET, ORALLY DISINTEGRATING ORAL EVERY 8 HOURS PRN
Qty: 8 TABLET | Refills: 0 | Status: SHIPPED | OUTPATIENT
Start: 2024-05-30

## 2024-05-30 RX ORDER — ONDANSETRON 2 MG/ML
4 INJECTION INTRAMUSCULAR; INTRAVENOUS EVERY 30 MIN PRN
Status: DISCONTINUED | OUTPATIENT
Start: 2024-05-30 | End: 2024-05-30 | Stop reason: HOSPADM

## 2024-05-30 RX ORDER — ACETAMINOPHEN 325 MG/1
650 TABLET ORAL
Status: DISCONTINUED | OUTPATIENT
Start: 2024-05-30 | End: 2024-05-30 | Stop reason: HOSPADM

## 2024-05-30 RX ORDER — OXYCODONE HYDROCHLORIDE 5 MG/1
10 TABLET ORAL
Status: DISCONTINUED | OUTPATIENT
Start: 2024-05-30 | End: 2024-05-30 | Stop reason: HOSPADM

## 2024-05-30 RX ORDER — IBUPROFEN 600 MG/1
600 TABLET, FILM COATED ORAL EVERY 6 HOURS PRN
Qty: 30 TABLET | Refills: 0 | Status: SHIPPED | OUTPATIENT
Start: 2024-05-30

## 2024-05-30 RX ORDER — LABETALOL HYDROCHLORIDE 5 MG/ML
10 INJECTION, SOLUTION INTRAVENOUS
Status: DISCONTINUED | OUTPATIENT
Start: 2024-05-30 | End: 2024-05-30 | Stop reason: HOSPADM

## 2024-05-30 RX ORDER — GLYCOPYRROLATE 0.2 MG/ML
INJECTION, SOLUTION INTRAMUSCULAR; INTRAVENOUS PRN
Status: DISCONTINUED | OUTPATIENT
Start: 2024-05-30 | End: 2024-05-30

## 2024-05-30 RX ADMIN — GLYCOPYRROLATE 0.2 MG: 0.2 INJECTION, SOLUTION INTRAMUSCULAR; INTRAVENOUS at 09:25

## 2024-05-30 RX ADMIN — SODIUM CHLORIDE, POTASSIUM CHLORIDE, SODIUM LACTATE AND CALCIUM CHLORIDE: 600; 310; 30; 20 INJECTION, SOLUTION INTRAVENOUS at 10:30

## 2024-05-30 RX ADMIN — FENTANYL CITRATE 100 MCG: 50 INJECTION INTRAMUSCULAR; INTRAVENOUS at 09:25

## 2024-05-30 RX ADMIN — ONDANSETRON 4 MG: 2 INJECTION INTRAMUSCULAR; INTRAVENOUS at 09:25

## 2024-05-30 RX ADMIN — INDOCYANINE GREEN AND WATER 2.5 MG: KIT at 08:01

## 2024-05-30 RX ADMIN — OXYCODONE HYDROCHLORIDE 5 MG: 5 TABLET ORAL at 11:44

## 2024-05-30 RX ADMIN — DEXAMETHASONE SODIUM PHOSPHATE 8 MG: 4 INJECTION, SOLUTION INTRA-ARTICULAR; INTRALESIONAL; INTRAMUSCULAR; INTRAVENOUS; SOFT TISSUE at 09:25

## 2024-05-30 RX ADMIN — PHENYLEPHRINE HYDROCHLORIDE 200 MCG: 10 INJECTION INTRAVENOUS at 09:35

## 2024-05-30 RX ADMIN — ACETAMINOPHEN 975 MG: 325 TABLET, FILM COATED ORAL at 11:44

## 2024-05-30 RX ADMIN — LIDOCAINE HYDROCHLORIDE 50 MG: 20 INJECTION, SOLUTION INFILTRATION; PERINEURAL at 09:25

## 2024-05-30 RX ADMIN — HYDROMORPHONE HYDROCHLORIDE 1 MG: 1 INJECTION, SOLUTION INTRAMUSCULAR; INTRAVENOUS; SUBCUTANEOUS at 09:25

## 2024-05-30 RX ADMIN — SUGAMMADEX 200 MG: 100 INJECTION, SOLUTION INTRAVENOUS at 10:34

## 2024-05-30 RX ADMIN — PROPOFOL 200 MG: 10 INJECTION, EMULSION INTRAVENOUS at 09:25

## 2024-05-30 RX ADMIN — KETOROLAC TROMETHAMINE 30 MG: 30 INJECTION, SOLUTION INTRAMUSCULAR at 09:25

## 2024-05-30 RX ADMIN — ROCURONIUM BROMIDE 60 MG: 50 INJECTION, SOLUTION INTRAVENOUS at 09:25

## 2024-05-30 RX ADMIN — FENTANYL CITRATE 50 MCG: 50 INJECTION, SOLUTION INTRAMUSCULAR; INTRAVENOUS at 11:08

## 2024-05-30 RX ADMIN — SODIUM CHLORIDE, POTASSIUM CHLORIDE, SODIUM LACTATE AND CALCIUM CHLORIDE: 600; 310; 30; 20 INJECTION, SOLUTION INTRAVENOUS at 08:00

## 2024-05-30 RX ADMIN — Medication 2 G: at 09:25

## 2024-05-30 RX ADMIN — PROPOFOL 75 MCG/KG/MIN: 10 INJECTION, EMULSION INTRAVENOUS at 09:25

## 2024-05-30 ASSESSMENT — ACTIVITIES OF DAILY LIVING (ADL)
ADLS_ACUITY_SCORE: 29

## 2024-05-30 NOTE — ANESTHESIA POSTPROCEDURE EVALUATION
Patient: Jose Tovar    Procedure: Procedure(s):  CHOLECYSTECTOMY, ROBOT-ASSISTED       Anesthesia Type:  General    Note:  Disposition: Outpatient   Postop Pain Control: Uneventful            Sign Out: Well controlled pain   PONV: No   Neuro/Psych: Uneventful            Sign Out: Acceptable/Baseline neuro status   Airway/Respiratory: Uneventful            Sign Out: Acceptable/Baseline resp. status   CV/Hemodynamics: Uneventful            Sign Out: Acceptable CV status; No obvious hypovolemia; No obvious fluid overload   Other NRE: NONE   DID A NON-ROUTINE EVENT OCCUR? No           Last vitals:  Vitals Value Taken Time   /90 05/30/24 1145   Temp 98.1  F (36.7  C) 05/30/24 1145   Pulse 95 05/30/24 1200   Resp 20 05/30/24 1200   SpO2 95 % 05/30/24 1200   Vitals shown include unfiled device data.    Electronically Signed By: Nikko Nguyen MD  May 30, 2024  1:12 PM

## 2024-05-30 NOTE — OP NOTE
[facility] General Surgery Operative Note    Pre-operative diagnosis: symptomatic gallstones   Post-operative diagnosis: same and chronic cholecystitis   Procedure: Robotic cholecystectomy    Surgeon: Chio Davila MD   Assistant(s): Mira Sims PA-C  The Physician Assistant was medically necessary for their expertise in prepping, robotic instrument exchange, passing of material through port sites, closure of port sites, suturing and retraction.   Anesthesia: general   Estimated blood loss:  Specimen: 15 cc  gallbladder and contents               INDICATION FOR OPERATION: This is a 48 year old male who presented to the emergency room recently with abdominal pain. Studies including ultrasound were consistent with gallstones and possible cholecystitis. He met with me in clinic and we discussed robotic assisted laparoscopic cholecystectomy and the patient agreed to proceed after hearing the risks and benefits.    DESCRIPTION OF PROCEDURE:  The patient was taken to the operating room and placed on the table in supine position.  General endotracheal anesthesia was induced and the abdomen was prepped and draped in standard sterile fashion.    Access was gained in the left upper quadrant at [place] point with a 5mm 0 degree laparscopic with a visiport trocar under direct visualization.   The abdomen was insufflated with CO2.  Additional 8mm robotic ports were placed in an oblique line from right lower quadrant with even spacing to the left upper quadrant port which was replaced with an 8mm robotic port. The patient was placed in reverse Trendelenburg and right side up.  The robot was then docked.    The fundus of the gallbladder was grasped and retracted cephalad with a prograsp. Fairly extensive and dense omental adhesions were taken down with cautery.  The infundibulum was grasped and retracted laterally.  The peritoneum over the medial and lateral aspects of the triangle of Calot was taken down with  blunt dissection and cautery.  The cystic duct and artery were freed up from surrounding tissues.  The triangle of Calot was skeletonized revealing the critical view of safety. There was a large reactive Calot node in the triangle which had some oozing and this was controlled.  Intraoperative fluorescence was used to evaluate the biliary anatomy with no additional biliary structures lighting up other than the cystic duct and common bile duct.    The duct was clipped twice proximally, once distally, and the cystic artery cauterized with bipolar distally then clipped once proximally, then both were transected.  The gallbladder was then removed from the liver using the cautery.  Before the gallbladder was completely removed from the cystic plate fluorescence was again used to evaluate for any additional biliary structures and none seen. The gallbladder was passed into an Endocatch bag in the left upper quadrant port site. We observed the right upper quadrant carefully for hemostasis.  Hemostasis was assured.      The endocatch bag was removed from the abdomen, which required enlarging the port site with a clamp. The fascia was closed with an 0 vicryl on a Robert Hammer needle.    All of the incisions were closed with interrupted 4-0 Vicryl subcuticular sutures and Steri-Strips.  The patient tolerated the procedure well.  Sponge and instrument counts were correct.      FINDINGS: dense omental adhesions and gallstones    Chio Davila MD

## 2024-05-30 NOTE — ANESTHESIA PROCEDURE NOTES
Airway       Patient location during procedure: OR       Procedure Start/Stop Times: 5/30/2024 9:27 AM  Staff -        CRNA: Iftikhar Spears APRN CRNA       Performed By: CRNAIndications and Patient Condition       Indications for airway management: eugenio-procedural and airway protection       Induction type:intravenous       Mask difficulty assessment: 1 - vent by mask    Final Airway Details       Final airway type: endotracheal airway       Successful airway: ETT - single  Endotracheal Airway Details        ETT size (mm): 8.0       Cuffed: yes       Successful intubation technique: direct laryngoscopy       DL Blade Type: MAC 3       Grade View of Cords: 1       Adjucts: stylet       Position: Right       Measured from: gums/teeth       Secured at (cm): 25       Bite block used: None    Post intubation assessment        Placement verified by: capnometry, equal breath sounds and chest rise        Number of attempts at approach: 1       Secured with: tape       Ease of procedure: easy       Dentition: Intact    Medication(s) Administered   Medication Administration Time: 5/30/2024 9:27 AM

## 2024-05-30 NOTE — DISCHARGE INSTRUCTIONS
Dr. Davila  [facility] [phone]     HOME CARE FOLLOWING LAPAROSCOPIC CHOLECYSTECTOMY  MISA Liao, HEMANT Juárez, PATRIC Cheatham    INCISIONAL CARE:  Replace the bandage over your incisions DAILY until all drainage stops, or if more comfortable to have in place.  If present, leave the steri-strips (white paper tapes) in place for 14 days after surgery.  If Dermabond (a type of skin glue) is present, leave in place until it wears/flakes off (2-3 weeks).     BATHING:  OK to shower 48 hours after surgery.  Avoid baths for 1 week after surgery.  You may wash your hair at any time.  Gently pat your incision dry after bathing.  Do not apply lotions, creams, or ointments to incisions.    ACTIVITY:  Light Activity -- you may immediately be up and about as tolerated.  Walking is encouraged, increase as tolerated.  Driving/Light Work-- when comfortable and off narcotic pain medications.  Strenuous Work/Activity -- limit lifting to 20 pounds for 2 weeks.  Progressively increase with time.  Active Sports (running, biking, etc.) -- cautiously resume after 2 weeks.    DISCOMFORT:  Local anesthetic placed at surgery should provide relief for 4-8 hours.  Begin taking pain pills before discomfort is severe.  Take the pain medication with some food, when possible, to minimize side effects.  Intermittent use of ice packs may help during the first 1-3 weeks after surgery.  Expect gradual improvement.    Recommend the following over the counter medications:  - Ibuprofen (motrin) 600mg every 6 hours (max 2,400mg per day)   - Tylenol (acetaminophen) 500-1000mg every 6 hours (max 4,000mg per day)  - Take with food if GI upset occurs  - If additional pain medication is needed, take the narcotic that you were prescribed.  Over-the-counter anti-inflammatory medications (i.e. Ibuprofen/Advil/Motrin or Naprosyn/Aleve) may be used per package instructions in addition to or while tapering off the  narcotic pain medications to decrease swelling and sensitivity.  DO NOT TAKE these Anti-inflammatory medications if your primary physician has advised against doing so, or if you have acid reflux, ulcer, or bleeding disorder, or take blood-thinner medications.  Call your primary physician or the surgery office if you have medication questions.    After laparoscopic cholecystectomy, you may have shoulder or upper back discomfort due to the gas used during surgery.  This is temporary and should resolve within 2-3 days.  Frequent short walks may help with this.  You may have decreased energy level for 1-2 weeks after surgery related to your recovery.    DIET:  Start with liquids and gradually increase diet as tolerated.  Drink plenty of fluids.  While taking pain medications, consider use of a stool softener, increase your fiber in your diet, or add a fiber supplement (like Metamucil, Citrucel) to help prevent constipation - a possible side effect of pain medications.  It is not uncommon to experience some bowel changes (loose stools or constipation) after surgery.  Your body has to adapt to you no longer having a gall bladder.  To help minimize this side effect, avoid fatty foods for 1-2 weeks after surgery.  You may then slowly increase the amount of fatty foods in your diet.      NAUSEA:  If nauseated from the anesthetic/pain meds; rest in bed, get up cautiously with assistance, and drink clear liquids (juice, tea, broth).    FOLLOW-UP AFTER SURGERY:  -Our office will contact you approximately 2-3 weeks after surgery to check on your progress and answer any questions you may have.  If you are doing well, you will not need to return for an office appointment.  If any concerns are identified over the phone, we will help you make an appointment to see a provider.    -If you have not received a phone call, have any questions or concerns, or would like to be seen, please call us at [phone].  We are located at: [address], [address]; [place] [address]    -CONTACT US IF THE FOLLOWING DEVELOPS:   1. A fever that is above 101     2. Increased redness, warmth, drainage, bleeding, or swelling.   3. Pain that is not relieved by rest/ice and your prescription.   4.  Increasing pain after 48 hours.   5. Drainage that is thick, cloudy, yellow, green or white.   6. Any other questions or concerns.      FREQUENTLY ASKED QUESTIONS:    Q:  How should my incision look?    A:  Normally your incision will appear slightly swollen with light redness directly along the incision itself as it heals.  It may feel like a bump or ridge as the healing/scarring happens, and over time (3-4 months) this bump or ridge feeling should slowly go away.  In general, clear or pink watery drainage can be normal at first as your incision heals, but should decrease over time.    Q:  How do I know if my incision is infected?  A:  Look at your incision for signs of infection, like redness around the incision spreading to surrounding skin, or drainage of cloudy or foul-smelling drainage.  If you feel warm, check your temperature to see if you are running a fever.    **If any of these things occur, please notify the nurse at our office.  We may need you to come into the office for an incision check.      Q:  How do I take care of my incision?  A:  If you have a dressing in place - Starting the day after surgery, replace the dressing 1-2 times a day until there is no further drainage from the incision.  At that time, a dressing is no longer needed.  Try to minimize tape on the skin if irritation is occurring at the tape sites.  If you have significant irritation from tape on the skin, please call the office to discuss other method of dressing your incision.    Small pieces of tape called  steri-strips  may be present directly overlying your incision; these may be removed 10 days after surgery unless otherwise specified by your surgeon.  If these tapes start  to loosen at the ends, you may trim them back until they fall off or are removed.    A:  If you had  Dermabond  tissue glue used as a dressing (this causes your incision to look shiny with a clear covering over it) - This type of dressing wears off with time and does not require more dressings over the top unless it is draining around the glue as it wears off.  Do not apply ointments or lotions over the incisions until the glue has completely worn off.    Q:  There is a piece of tape or a sticky  lead  still on my skin.  Can I remove this?  A:  Sometimes the sticky  leads  used for monitoring during surgery or for evaluation in the emergency department are not all removed while you are in the hospital.  These sometimes have a tab or metal dot on them.  You can easily remove these on your own, like taking off a band-aid.  If there is a gel substance under the  lead , simply wipe/clean it off with a washcloth or paper towel.      Q:  What can I do to minimize constipation (very hard stools, or lack of stools)?  A:  Stay well hydrated.  Increase your dietary fiber intake or take a fiber supplement -with plenty of water.  Walk around frequently.  You may consider an over-the-counter stool-softener.  Your Pharmacist can assist you with choosing one that is stocked at your pharmacy.  Constipation is also one of the most common side effects of pain medication.  If you are using pain medication, be pro-active and try to PREVENT problems with constipation by taking the steps above BEFORE constipation becomes a problem.    Q:  What do I do if I need more pain medications?  A:  Call the office to receive refills.  Be aware that certain pain meds cannot be called into a pharmacy and actually require a paper prescription.  A change may be made in your pain med as you progress thru your recovery period or if you have side effects to certain meds.    --Pain meds are NOT refilled after 5pm on weekdays, and NOT AT ALL on the  weekends, so please look ahead to prevent problems.      Q:  Why am I having a hard time sleeping now that I am at home?  A:  Many medications you receive while you are in the hospital can impact your sleep for a number of days after your surgery/hospitalization.  Decreased level of activity and naps during the day may also make sleeping at night difficult.  Try to minimize day-time naps, and get up frequently during the day to walk around your home during your recovery time.  Sleep aides may be of some help, but are not recommended for long-term use.      Q:  I am having some back discomfort.  What should I do?  A:  This may be related to certain positioning that was required for your surgery, extended periods of time in bed, or other changes in your overall activity level.  You may try ice, heat, acetaminophen, or ibuprofen to treat this temporarily.  Note that many pain medications have acetaminophen in them and would state this on the prescription bottle.  Be sure not to exceed the maximum of 4000mg per day of acetaminophen.     **If the pain you are having does not resolve, is severe, or is a flare of back pain you have had on other occasions prior to surgery, please contact your primary physician for further recommendations or for an appointment to be examined at their office.    Q:  Why am I having headaches?  A:  Headaches can be caused by many things:  caffeine withdrawal, use of pain meds, dehydration, high blood pressure, lack of sleep, over-activity/exhaustion, flare-up of usual migraine headaches.  If you feel this is related to muscle tension (a band-like feeling around the head, or a pressure at the low-back of the head) you may try ice or heat to this area.  You may need to drink more fluids (try electrolyte drink like Gatorade), rest, or take your usual migraine medications.   **If your headaches do not resolve, worsen, are accompanied by other symptoms, or if your blood pressure is high, please  call your primary physician for recommendation and/or examination.    Q:  I am unable to urinate.  What do I do?  A:  A small percentage of people can have difficulty urinating initially after surgery.  This includes being able to urinate only a very small amount at a time and feeling discomfort or pressure in the very low abdomen.  This is called  urinary retention , and is actually an urgent situation.  Proceed to your nearest Emergency department for evaluation (not an Urgent Care Center).  Sometimes the bladder does not work correctly after certain medications you receive during surgery, or related to certain procedures.  You may need to have a catheter placed until your bladder recovers.  When planning to go to an Emergency department, it may help to call the ER to let them know you are coming in for this problem after a surgery.  This may help you get in quicker to be evaluated.  **If you have symptoms of a urinary tract infection, please contact your primary physician for the proper evaluation and treatment.          If you have other questions, please call the office Monday thru Friday between 8am and 4:30pm to discuss with the nurse or physician assistant.  #[phone]    There is a surgeon ON CALL on weekday evenings and over the weekend in case of urgent need only, and may be contacted at the same number.    If you are having an emergency, call 911 or proceed to your nearest emergency department.    If a green dye was used during your procedure, your urine will initially be bright blue or greenish.  It will gradually return to yellow throughout the day.  Drinking plenty of fluids will help to filter the dye from your urine.     Maximum acetaminophen (Tylenol) dose from all sources should not exceed 4 grams (4000 mg) per day. You had Tylenol 975mg around 11:45AM  You received Toradol, an IV form of Ibuprofen (Motrin) at 9:30 am  Do not take any Ibuprofen products until 3:30 pm.

## 2024-05-30 NOTE — ANESTHESIA CARE TRANSFER NOTE
Patient: Jose Tovar    Procedure: Procedure(s):  CHOLECYSTECTOMY, ROBOT-ASSISTED       Diagnosis: Gallstones [K80.20]  Diagnosis Additional Information: No value filed.    Anesthesia Type:   General     Note:    Oropharynx: oropharynx clear of all foreign objects and spontaneously breathing  Level of Consciousness: drowsy  Oxygen Supplementation: face mask  Level of Supplemental Oxygen (L/min / FiO2): 10  Independent Airway: airway patency satisfactory and stable  Dentition: dentition unchanged  Vital Signs Stable: post-procedure vital signs reviewed and stable  Report to RN Given: handoff report given  Patient transferred to: PACU    Handoff Report: Identifed the Patient, Identified the Reponsible Provider, Reviewed the pertinent medical history, Discussed the surgical course, Reviewed Intra-OP anesthesia mangement and issues during anesthesia, Set expectations for post-procedure period and Allowed opportunity for questions and acknowledgement of understanding      Vitals:  Vitals Value Taken Time   BP     Temp     Pulse     Resp     SpO2         Electronically Signed By: CHELO Wiggins CRNA  May 30, 2024  10:39 AM

## 2024-05-30 NOTE — ANESTHESIA PREPROCEDURE EVALUATION
Anesthesia Pre-Procedure Evaluation    Patient: Jose Tovar   MRN: [id number] : 1975        Procedure : Procedure(s):  CHOLECYSTECTOMY, ROBOT-ASSISTED          History reviewed. No pertinent past medical history.   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 Type: General and MAC.    No history of anesthetic complications       ROS/MED HX  ENT/Pulmonary:  - neg pulmonary ROS     Neurologic:  - neg neurologic ROS     Cardiovascular:  - neg cardiovascular ROS     METS/Exercise Tolerance:     Hematologic:  - neg hematologic  ROS     Musculoskeletal:  - neg musculoskeletal ROS     GI/Hepatic:     (+)          cholecystitis/cholelithiasis,          Renal/Genitourinary:  - neg Renal ROS     Endo: Comment: Class 1 obesity    (+)               Obesity,       Psychiatric/Substance Use:  - neg psychiatric ROS     Infectious Disease:  - neg infectious disease ROS     Malignancy:  - neg malignancy ROS     Other:  - neg other ROS          Physical Exam    Airway        Mallampati: II   TM distance: > 3 FB   Neck ROM: full   Mouth opening: > 3 cm    Respiratory Devices and Support         Dental           Cardiovascular   cardiovascular exam normal       Rhythm and rate: regular and normal     Pulmonary   pulmonary exam normal        breath sounds clear to auscultation       Other findings: Lab Test        24                        "0937          1258          WBC          13.4*        7.8           HGB          17.0         17.2          MCV          84           86            PLT          258          237            Lab Test        05/16/24 11/07/22                       0937          1258          NA           136          139           POTASSIUM    4.0          4.3           CHLORIDE     100          105           CO2          21*          25            BUN          17.3         17.7          CR           0.92         0.98          ANIONGAP     15           9             TYRONE          8.9          9.2           GLC          152*         89                   EKG Interpretation:       OUTSIDE LABS:  CBC:   Lab Results   Component Value Date    WBC 13.4 (H) 05/16/2024    WBC 7.8 11/07/2022    HGB 17.0 05/16/2024    HGB 17.2 11/07/2022    HCT 49.5 05/16/2024    HCT 52.1 11/07/2022     05/16/2024     11/07/2022     BMP:   Lab Results   Component Value Date     05/16/2024     11/07/2022    POTASSIUM 4.0 05/16/2024    POTASSIUM 4.3 11/07/2022    CHLORIDE 100 05/16/2024    CHLORIDE 105 11/07/2022    CO2 21 (L) 05/16/2024    CO2 25 11/07/2022    BUN 17.3 05/16/2024    BUN 17.7 11/07/2022    CR 0.92 05/16/2024    CR 0.98 11/07/2022     (H) 05/16/2024    GLC 89 11/07/2022     COAGS: No results found for: \"PTT\", \"INR\", \"FIBR\"  POC: No results found for: \"BGM\", \"HCG\", \"HCGS\"  HEPATIC:   Lab Results   Component Value Date    ALBUMIN 4.6 05/16/2024    PROTTOTAL 8.1 05/16/2024    ALT 43 05/16/2024    AST 34 05/16/2024    ALKPHOS 107 05/16/2024    BILITOTAL 0.5 05/16/2024     OTHER:   Lab Results   Component Value Date    TYRONE 8.9 05/16/2024    LIPASE 23 05/16/2024    TSH 5.202 12/17/2008       Anesthesia Plan    ASA Status:  2    NPO Status:  NPO Appropriate    Anesthesia Type: General.     - Airway: ETT   Induction: Intravenous.   Maintenance: Balanced.        Consents    Anesthesia Plan(s) and associated risks, " "benefits, and realistic alternatives discussed. Questions answered and patient/representative(s) expressed understanding.     - Discussed: Risks, Benefits and Alternatives for BOTH SEDATION and the PROCEDURE were discussed     - Discussed with:  Patient      - Extended Intubation/Ventilatory Support Discussed: No.      - Patient is DNR/DNI Status: No     Use of blood products discussed: No .     Postoperative Care    Pain management: IV analgesics, Oral pain medications, Multi-modal analgesia.   PONV prophylaxis: Ondansetron (or other 5HT-3), Dexamethasone or Solumedrol, Background Propofol Infusion     Comments:               Jose Eduardo Mir MD    I have reviewed the pertinent notes and labs in the chart from the past 30 days and (re)examined the patient.  Any updates or changes from those notes are reflected in this note.              # Obesity: Estimated body mass index is 33.64 kg/m  as calculated from the following:    Height as of 5/20/24: 1.88 m (6' 2\").    Weight as of 5/20/24: 118.8 kg (262 lb).      "

## 2024-05-31 LAB
PATH REPORT.COMMENTS IMP SPEC: NORMAL
PATH REPORT.COMMENTS IMP SPEC: NORMAL
PATH REPORT.FINAL DX SPEC: NORMAL
PATH REPORT.GROSS SPEC: NORMAL
PATH REPORT.MICROSCOPIC SPEC OTHER STN: NORMAL
PATH REPORT.RELEVANT HX SPEC: NORMAL
PHOTO IMAGE: NORMAL

## 2024-06-11 ENCOUNTER — OFFICE VISIT (OUTPATIENT)
Dept: SURGERY | Facility: CLINIC | Age: 49
End: 2024-06-11
Payer: COMMERCIAL

## 2024-06-11 VITALS
HEIGHT: 74 IN | OXYGEN SATURATION: 97 % | WEIGHT: 256 LBS | RESPIRATION RATE: 16 BRPM | HEART RATE: 80 BPM | DIASTOLIC BLOOD PRESSURE: 82 MMHG | BODY MASS INDEX: 32.85 KG/M2 | SYSTOLIC BLOOD PRESSURE: 116 MMHG

## 2024-06-11 DIAGNOSIS — K80.20 GALLSTONES: ICD-10-CM

## 2024-06-11 PROCEDURE — 99024 POSTOP FOLLOW-UP VISIT: CPT | Performed by: SURGERY

## 2024-06-11 RX ORDER — OXYCODONE HYDROCHLORIDE 5 MG/1
5-10 TABLET ORAL EVERY 4 HOURS PRN
Qty: 20 TABLET | Refills: 0 | Status: SHIPPED | OUTPATIENT
Start: 2024-06-11

## 2024-06-11 NOTE — LETTER
June 11, 2024      RE:   Jose Tovar 1975    Dear Colleague,    Thank you for referring your patient, Jose Tovar, to [facility]. Please see a copy of my visit note below.    [facility] Follow Up    Subjective:  Jose is here for his first postoperative visit. He underwent robotic cholecystectomy on 5/30/24, now almost 2 weeks postop. He recovered well, eating a normal diet and his bowel movements are normal. However he comes in today because he is having a lot of pain at his left upper quadrant port site.  He has a hard time sleeping at night due to the discomfort. The area under the incision is swollen as well. There has been no drainage or redness. 5mg of oxycodone doesn't seem to touch the pain. He has 5 tablets left of the initial 10 tablets he was prescribed    Objective:  Abd - soft, non-tender, non-distended  Inc(s) - right sided and left periumbilical incision c/d/i, healing well, no erythema. The left upper quadrant port site incision is healing well with an area of firmness in the soft tissue under the incision and moderate tenderness to palpation. No erythema of the skin or ecchymosis.    Assessment Plan:  Healing well from lap shannan other than pain at LUQ port site. Reviewed the left upper quadrant port site was the gallbladder extraction site and a fascial suture was placed to prevent hernia so the pain is likely that suture going through the muscle. The soft tissue underlying appears mildly swollen but there is no evidence of infection. There could be a small hematoma in the muscle tissue. This should all resolve over the next few weeks. If he notices increasing swelling, redness or drainage from the incision he will call or come back to clinic. Prescribed refill #20 tabs of 5mg oxycodone to be used sparingly, 2 tablets before bed. Recommended to continue 600mg ibuprofen every 6 hours and ice intermittently to reduce inflammation.  Activity  as tolerated  RTC PRN    Again, thank you for allowing me to participate in the care of your patient.      Sincerely,    Chio Davila MD

## 2024-06-11 NOTE — PROGRESS NOTES
[facility] Follow Up    Subjective:  Jose is here for his first postoperative visit. He underwent robotic cholecystectomy on 5/30/24, now almost 2 weeks postop. He recovered well, eating a normal diet and his bowel movements are normal. However he comes in today because he is having a lot of pain at his left upper quadrant port site.  He has a hard time sleeping at night due to the discomfort. The area under the incision is swollen as well. There has been no drainage or redness. 5mg of oxycodone doesn't seem to touch the pain. He has 5 tablets left of the initial 10 tablets he was prescribed    Objective:  Abd - soft, non-tender, non-distended  Inc(s) - right sided and left periumbilical incision c/d/i, healing well, no erythema. The left upper quadrant port site incision is healing well with an area of firmness in the soft tissue under the incision and moderate tenderness to palpation. No erythema of the skin or ecchymosis.    Assessment Plan:  Healing well from lap shannan other than pain at LUQ port site. Reviewed the left upper quadrant port site was the gallbladder extraction site and a fascial suture was placed to prevent hernia so the pain is likely that suture going through the muscle. The soft tissue underlying appears mildly swollen but there is no evidence of infection. There could be a small hematoma in the muscle tissue. This should all resolve over the next few weeks. If he notices increasing swelling, redness or drainage from the incision he will call or come back to clinic. Prescribed refill #20 tabs of 5mg oxycodone to be used sparingly, 2 tablets before bed. Recommended to continue 600mg ibuprofen every 6 hours and ice intermittently to reduce inflammation.  Activity as tolerated  RTC PRN    Chio Davila MD

## 2024-12-04 ENCOUNTER — OFFICE VISIT (OUTPATIENT)
Dept: SURGERY | Facility: CLINIC | Age: 49
End: 2024-12-04
Payer: COMMERCIAL

## 2024-12-04 VITALS
HEART RATE: 97 BPM | OXYGEN SATURATION: 97 % | RESPIRATION RATE: 16 BRPM | DIASTOLIC BLOOD PRESSURE: 84 MMHG | BODY MASS INDEX: 32.87 KG/M2 | SYSTOLIC BLOOD PRESSURE: 124 MMHG | WEIGHT: 256 LBS

## 2024-12-04 DIAGNOSIS — R10.12 ABDOMINAL WALL PAIN IN LEFT UPPER QUADRANT: ICD-10-CM

## 2024-12-04 DIAGNOSIS — R19.02 LEFT UPPER QUADRANT ABDOMINAL MASS: Primary | ICD-10-CM

## 2024-12-04 NOTE — PROGRESS NOTES
[facility] Clinic Note   Subjective:  Jose Tovar is here for follow-up visit regarding ongoing incisional pain at LUQ site.  He underwent Robotic Laparoscopic Cholecystectomy by Dr. Davila on 5/30/24.  The soreness/swelling/lump sensation at the LUQ site continues to bother him.  Severity waxes and wanes but he describes a 6cm area of swelling/discomfort at the site as recently as a couple weeks ago.  No injuries, no heavy lifting at that time.  It feels a bit better today than it was then, but still a lump and tenderness.    Objective:  Abd - soft, tender at/inferior/medial to the LUQ incision scar - this is where a transfascial suture was placed at the time of surgery in May.  The suture is fully dissolved by this time.  Valsalva maneuvers done, no change in mass but is more tender.    Laparoscopic incisions - well healed    Assessment:  S/p Robotic Laparoscopic Cholecystectomy  -LUQ incision site pain, question seroma/hematoma/hernia, scar/nerve pain    Plan:  Jose was recommended to have CT eval of incision site for assessment of healing.  Jose is in agreement with this plan.    Mira Sims PA-C      Please route or send letter to:  PCP

## 2025-01-27 ENCOUNTER — HOSPITAL ENCOUNTER (OUTPATIENT)
Dept: CT IMAGING | Facility: CLINIC | Age: 50
Discharge: HOME | End: 2025-01-27
Attending: PHYSICIAN ASSISTANT | Admitting: PHYSICIAN ASSISTANT
Payer: COMMERCIAL

## 2025-01-27 DIAGNOSIS — R19.02 LEFT UPPER QUADRANT ABDOMINAL MASS: ICD-10-CM

## 2025-01-27 DIAGNOSIS — R10.12 ABDOMINAL WALL PAIN IN LEFT UPPER QUADRANT: ICD-10-CM

## 2025-01-27 PROCEDURE — 74150 CT ABDOMEN W/O CONTRAST: CPT

## 2025-02-04 ENCOUNTER — MYC MEDICAL ADVICE (OUTPATIENT)
Dept: SURGERY | Facility: CLINIC | Age: 50
End: 2025-02-04
Payer: COMMERCIAL

## 2025-02-04 DIAGNOSIS — R10.12 ABDOMINAL WALL PAIN IN LEFT UPPER QUADRANT: ICD-10-CM

## 2025-02-04 DIAGNOSIS — R19.02 LEFT UPPER QUADRANT ABDOMINAL MASS: Primary | ICD-10-CM

## 2025-03-16 ENCOUNTER — HEALTH MAINTENANCE LETTER (OUTPATIENT)
Age: 50
End: 2025-03-16

## 2025-03-19 ASSESSMENT — PAIN SCALES - PAIN ENJOYMENT GENERAL ACTIVITY SCALE (PEG)
AVG_PAIN_PASTWEEK: 5
PEG_TOTALSCORE: 3.33
INTERFERED_GENERAL_ACTIVITY: 3
PEG_TOTALSCORE: 3.33
INTERFERED_ENJOYMENT_LIFE: 2
INTERFERED_GENERAL_ACTIVITY: 3
AVG_PAIN_PASTWEEK: 5
INTERFERED_ENJOYMENT_LIFE: 2

## 2025-03-19 ASSESSMENT — ANXIETY QUESTIONNAIRES
IF YOU CHECKED OFF ANY PROBLEMS ON THIS QUESTIONNAIRE, HOW DIFFICULT HAVE THESE PROBLEMS MADE IT FOR YOU TO DO YOUR WORK, TAKE CARE OF THINGS AT HOME, OR GET ALONG WITH OTHER PEOPLE: NOT DIFFICULT AT ALL
7. FEELING AFRAID AS IF SOMETHING AWFUL MIGHT HAPPEN: NOT AT ALL
5. BEING SO RESTLESS THAT IT IS HARD TO SIT STILL: NOT AT ALL
7. FEELING AFRAID AS IF SOMETHING AWFUL MIGHT HAPPEN: NOT AT ALL
GAD7 TOTAL SCORE: 0
GAD7 TOTAL SCORE: 0
8. IF YOU CHECKED OFF ANY PROBLEMS, HOW DIFFICULT HAVE THESE MADE IT FOR YOU TO DO YOUR WORK, TAKE CARE OF THINGS AT HOME, OR GET ALONG WITH OTHER PEOPLE?: NOT DIFFICULT AT ALL
4. TROUBLE RELAXING: NOT AT ALL
1. FEELING NERVOUS, ANXIOUS, OR ON EDGE: NOT AT ALL
3. WORRYING TOO MUCH ABOUT DIFFERENT THINGS: NOT AT ALL
2. NOT BEING ABLE TO STOP OR CONTROL WORRYING: NOT AT ALL
GAD7 TOTAL SCORE: 0
6. BECOMING EASILY ANNOYED OR IRRITABLE: NOT AT ALL

## 2025-03-23 NOTE — PROGRESS NOTES
March 24, 2025        COMPREHENSIVE PAIN CLINIC INITIAL EVALUATION  I had the pleasure of meeting Mr. Jose Tovar on 3/24/2025 in the [facility] in consult for Dr. Chio Davila, General Surgeon with regards to his pain.  The patient is a 49 year old male with past medical history of LUQ pain, wrist pain, chronic intractable pain who presents for evaluation of chronic pain.  UDS and opioid agreement completed on    .      History of of chronic pain on initial exam 3/24/2025                               Subjective:  He presents alone.    Patient endorses chronic pain in L) upper quadrant where a transfacial suturue was placed on 5/20/2024 during robotic cholecystectomy with Dr. Chio Davila, General Surgeon.  The patient describes the pain as constant, aching to shooting that he started noticing about 2 months after the surgery. The pain is localized to a bump that can be palpated at 7:00 position to the LUQ scar. Patient denies numbness and tingling anywhere.  Patient denies any weakness anywhere.  He reports that the pain is made worse by prolong sitting, lifting, laying, standing.  His pain is improved with frequent position changes.   He rates his currenty pain score at 4/10, but it can be as low as 4/10 or as severe as 7/10.  Pain interferes with activities and ADL's.  Pain does not interfere with sleep or school.  He has not had any PT/myofacial release for this pain.    Patient denies anxiety and depression.  Patient does not follow with a mental health care provider.  Patient exercises by hiking and weights at the gym.      Progress Notes Reviewed:  12/4/2024 Mira Sims PA-C - LUQ pain severity waxes and wanes but he describes a 6cm area of swelling/discomfort at the site   6/11/2024 Dr. Chio Davila, General Surgeon    He denies any new problems with falls or balance, any new numbness or weakness of the arms or legs, any new bowel or bladder incontinence, any night sweats or  unexplained fevers, or any sudden or unexpected weight loss.  He denies saddle anesthesia.     Jose Tovar has not been seen at a pain clinic in the past.        Current Treatments:  Ibuprofen prn    Anticoagulation:  none  Implantable devices:  none    Previous Medication Treatments Included:  Anti-convulsants: no  Muscle relaxors: no  Anti-depressants: no  Benzodiazapine's: no  Acetaminophen/NSAIDs: ibuprofen is not effective  Topicals: no  Opioids: no      Other Treatments Have Included:  Physical therapy: no  Pain Psychology: no  Chiropractic: no  Acupuncture: no  TENs Unit: no  Injections: no  Surgeries: no  Dry Needling: no  Massage:no          Past Medical History:  Medical history reviewed.  No past medical history on file.   Patient Active Problem List   Diagnosis    Coughing Up Blood (Hemoptysis)    Joint Pain, Localized In The Wrist       Past Surgical History:  Pertinent surgical history reviewed.  Past Surgical History:   Procedure Laterality Date    DAVINCI LAPAROSCOPIC CHOLECYSTECTOMY WITHOUT GRAMS N/A 5/30/2024    Procedure: CHOLECYSTECTOMY, ROBOT-ASSISTED;  Surgeon: Chio Davila MD;  Location: [facility] OR    REMOVAL OF SPERM DUCT(S)      Description: Surgery Of Male Genitalia Vasectomy;  Recorded: 01/30/2012;        Medications: Pertinent medications reviewed.  Current Outpatient Medications   Medication Sig Dispense Refill    clobetasol (TEMOVATE) 0.05 % external ointment as needed      EPINEPHrine (ANY BX GENERIC EQUIV) 0.3 MG/0.3ML injection 2-pack Inject 0.3 mg into the muscle      ketoconazole (NIZORAL) 2 % external shampoo as needed      omeprazole (PRILOSEC) 20 MG DR capsule daily         [place] Prescription Monitoring Program reviewed 3/23/2025.  No concern for abuse or misuse of controlled medications based on this report.  No controlled medications are currently being prescribed.    6/11/2024 Oxycodone 5mg 20 tabs - post cholecystectomy  5/30/2024 Oxycodone 5mg 10 tabs - post  cholecystectomy      Allergies: Pertinent allergies reviewed.     Allergies   Allergen Reactions    Bee Venom      Other Reaction(s): Edema,generalized    Honey Bee Venom Itching, Rash and Swelling       Family History:   family history is not on file.    Social History:   He is  and lives in an apartment in [place], [place]. He does not have any children.  He is independent in ADL's.  History of alcohol abuse.  He is currently in school for Business.  He does not have any children.  He  reports that he has never smoked. He has never been exposed to tobacco smoke. He has never used smokeless tobacco. He reports that he does not currently use alcohol. He reports that he does not use drugs.  Social History     Social History Narrative    Not on file         Review of Systems:      (Positive responses bolded)  GENERAL: fever/chills, fatigue, general unwell feeling, weight gain/loss  HEAD/EYES:  headache, dizziness, or vision changes  EARS/NOSE/THROAT: nosebleeds, hearing loss, sinus infection, earache, tinnitus  IMMUNE:  allergies, cancer, immune deficiency, or infections  SKIN:  itching, rash, hives  HEME/Lymphatic: anemia, easy bruising, easy bleeding  RESPIRATORY: cough, wheezing, or shortness of breath  CARDIOVASCULAR/Circulation: extremity edema, syncope, hypertension, tachycardia, or angina  GASTROINTESTINAL: abdominal pain, nausea/emesis, diarrhea, constipation, hematochezia, or melena  ENDOCRINE:  diabetes, steroid use, thyroid disease or osteoporosis  MUSCULOSKELETAL: myalgias, joint pain, stiffness, neck pain, back pain, arthritis, or gout  GENITOURINARY: frequency, urgency, dysuria, difficulty voiding, hematuria or incontinence  NEUROLOGIC: weakness, numbness, paresthesias, seizure, tremor, stroke or memory loss  PSYCHIATRIC: depression, anxiety, stress, suicidal thoughts/attempts or mood swings      Physical Exam:  BP (!) 145/88 (BP Location: Right arm)   Pulse 85   SpO2 95%     Constitutional: He is  oriented to person, place, and time.  He is overweight. He is not in acute distress.   HENT:     Head: Normocephalic and atraumatic.     Eyes: Pupils are equal, round, and reactive to light. EOM are normal. No scleral icterus.   Pulmonary/Chest:  NWOB. No respiratory distress.   Neurological: He is alert and oriented to person, place, and time. Coordination grossly normal.    Skin: Skin is warm and dry. He is not diaphoretic. 3 well healed laparoscopic scars 2  L) abdomen and 1 R) abdomen.  LUQ scar with residual 2-3mm bump at 7:00 which is tender to palpation..      Psychiatric: He has a normal mood and affect. His behavior is normal. Judgment and thought content normal.  Patient answers questions appropriately.  MSK: Gait is normal.        Imaging:  EXAM: CT ABDOMEN W/O CONTRAST  LOCATION: [facility]  DATE: 1/27/2025     INDICATION:  Left upper quadrant abdominal mass. Abdominal wall pain in left upper quadrant.  COMPARISON: None.  TECHNIQUE: CT scan of the abdomen was performed without IV contrast. Multiplanar reformats were obtained. Dose reduction techniques were used.   CONTRAST: None.     FINDINGS:    LOWER CHEST: Normal.     HEPATOBILIARY: Absent gallbladder. No focal lesions through the liver on this noncontrast exam.     PANCREAS: Normal.     SPLEEN: The spleen is borderline enlarged measuring 14.3 cm in length.     ADRENAL GLANDS: Normal.     KIDNEYS/BLADDER: 5 mm nonobstructing stone is seen at the lower pole left kidney. There is a small cyst at the upper pole left kidney with a high-attenuation 9 mm lesion at the very superior aspect that likely represents a hemorrhagic cyst. No   right-sided calculi or hydronephrosis.     BOWEL: No bowel obstruction. Descending colonic diverticulosis is noted without evidence of diverticulitis.     LYMPH NODES: Normal.     VASCULATURE: Normal.     MUSCULOSKELETAL: No significant bony abnormalities. At the site of the BB placed in the left upper  quadrant, there is no underlying soft tissue abnormality.                                                                      IMPRESSION:   1.  No abdominal wall abnormalities in the area of the palpable abnormality.  2.  Nonobstructing stone at the lower pole of the left kidney.  3.  Borderline splenomegaly.      EMG:  na      Diagnosis:  (R10.12) Abdominal pain, left upper quadrant  (primary encounter diagnosis)  Comment:   Plan: Adult Pain Clinic Follow-Up Order, PAIN         INJECTION EVAL/TREAT/FOLLOW UP            (G89.29) Chronic intractable pain  Comment:   Plan: Adult Pain Clinic Follow-Up Order, PAIN         INJECTION EVAL/TREAT/FOLLOW UP            Plan on initial consult on 3/24/2025:      Diagnostics:   CT of the abdomen was reviewed.      Medications:  The following OTC pain medications may be helpful, use as directed: Voltaren Gel 1%, CBD products, Arnica products, Capsaicin products, Australian Dream Cream, Epson It, Lidocaine Patch, Solanpas, Biofreeze, Aspercream, Tiger Balm and Job Emu cream.  Apply heat or cold PRN.      Therapies:  PHYSICAL THERAPY - Discussed myofacial release if TAP is not totally successful.  Discussed the importance of core strengthening, ROM, stretching exercises with the patient and how each of these entities is important in decreasing pain.  Explained to the patient that the purpose of physical therapy is to teach the patient a home exercise program.  These exercises need to be performed every day in order to decrease pain and prevent future occurrences of pain.        Discussed Frequency Specific Microcurrent if TAP is not totally successful  Treatment for Neuropathic Pain.   [facility] [phone]  [facility] [phone]  [facility] [phone]  [facility] [phone]  May be able to be billed as a chiropractic service depending on your insurance coverage.     Interventions:  Schedule LUQ TAP block with Dr. Patterson.    Follow  up:   Return to clinic 2 wks after the TAP block to reassess pain.        CHELO Pisano, LEXII  [facility] [place] [facility]        BILLING TIME DOCUMENTATION:   The total TIME spent on this patient on the date of the encounter/appointment was 41 minutes.            Answers submitted by the patient for this visit:  Patient Health Questionnaire (G7) (Submitted on 3/19/2025)  XAVIER 7 TOTAL SCORE: 0

## 2025-03-24 ENCOUNTER — OFFICE VISIT (OUTPATIENT)
Dept: PALLIATIVE MEDICINE | Facility: CLINIC | Age: 50
End: 2025-03-24
Attending: SURGERY
Payer: COMMERCIAL

## 2025-03-24 VITALS — DIASTOLIC BLOOD PRESSURE: 88 MMHG | OXYGEN SATURATION: 95 % | SYSTOLIC BLOOD PRESSURE: 145 MMHG | HEART RATE: 85 BPM

## 2025-03-24 DIAGNOSIS — R10.12 ABDOMINAL PAIN, LEFT UPPER QUADRANT: Primary | ICD-10-CM

## 2025-03-24 DIAGNOSIS — R10.12 ABDOMINAL WALL PAIN IN LEFT UPPER QUADRANT: ICD-10-CM

## 2025-03-24 DIAGNOSIS — G89.29 CHRONIC INTRACTABLE PAIN: ICD-10-CM

## 2025-03-24 PROCEDURE — G2211 COMPLEX E/M VISIT ADD ON: HCPCS | Performed by: NURSE PRACTITIONER

## 2025-03-24 PROCEDURE — 99203 OFFICE O/P NEW LOW 30 MIN: CPT | Performed by: NURSE PRACTITIONER

## 2025-03-24 ASSESSMENT — PAIN SCALES - GENERAL: PAINLEVEL_OUTOF10: MODERATE PAIN (5)

## 2025-03-24 NOTE — PATIENT INSTRUCTIONS
Plan on initial consult on 3/23/2025:  Interventions:  Schedule LUQ TAP block with Dr. Patterson.      Follow up:   Return to clinic 2 wks after the TAP block to reassess pain.        CHELO Pisano FNP  [facility]      [facility]    Clinic Number:  [phone]  Call with any questions about your care and for scheduling assistance.   Calls are returned Monday through Friday between 8 AM and 4:30 PM. We usually get back to you within 2 business days depending on the issue/request.    If we are prescribing your medications:  For opioid medication refills, call the clinic or send a [employer] message 7 days in advance.  Please include:  Name of requested medication  Name of the pharmacy.  For non-opioid medications, call your pharmacy directly to request a refill. Please allow 3-4 days to be processed.   Per [place] State Law:  All controlled substance prescriptions must be filled within 30 days of being written.    For those controlled substances allowing refills, pickup must occur within 30 days of last fill.      We believe regular attendance is key to your success in our program!    Any time you are unable to keep your appointment we ask that you call us at least 24 hours in advance to cancel.This will allow us to offer the appointment time to another patient.   Multiple missed appointments may lead to dismissal from the clinic.   
Last Lipid panel 6-21-22
Patient came inside the office asking for a refill on his rosuvastatin 10 mg  Sent to the [place] pharmacy  Patient next OV visit is [phone]  please call patient when this done and sent over please advise   Thank you
Statement Selected

## 2025-06-25 ASSESSMENT — PAIN SCALES - PAIN ENJOYMENT GENERAL ACTIVITY SCALE (PEG)
PEG_TOTALSCORE: 2.67
INTERFERED_ENJOYMENT_LIFE: 2
INTERFERED_GENERAL_ACTIVITY: 2
AVG_PAIN_PASTWEEK: 4

## 2025-06-30 ENCOUNTER — OFFICE VISIT (OUTPATIENT)
Dept: PALLIATIVE MEDICINE | Facility: CLINIC | Age: 50
End: 2025-06-30
Attending: NURSE PRACTITIONER
Payer: COMMERCIAL

## 2025-06-30 VITALS — HEART RATE: 72 BPM | DIASTOLIC BLOOD PRESSURE: 94 MMHG | SYSTOLIC BLOOD PRESSURE: 139 MMHG

## 2025-06-30 DIAGNOSIS — M79.18 MYOFASCIAL MUSCLE PAIN: ICD-10-CM

## 2025-06-30 DIAGNOSIS — R10.12 ABDOMINAL PAIN, LEFT UPPER QUADRANT: ICD-10-CM

## 2025-06-30 DIAGNOSIS — R10.12 ABDOMINAL WALL PAIN IN LEFT UPPER QUADRANT: Primary | ICD-10-CM

## 2025-06-30 PROCEDURE — 64486 TAP BLOCK UNIL BY INJECTION: CPT | Performed by: PAIN MEDICINE

## 2025-06-30 RX ADMIN — TRIAMCINOLONE ACETONIDE 40 MG: 40 INJECTION, SUSPENSION INTRA-ARTICULAR; INTRAMUSCULAR at 20:08

## 2025-06-30 RX ADMIN — BUPIVACAINE HYDROCHLORIDE 25 MG: 2.5 INJECTION, SOLUTION EPIDURAL; INFILTRATION; INTRACAUDAL; PERINEURAL at 20:08

## 2025-06-30 ASSESSMENT — PAIN SCALES - GENERAL: PAINLEVEL_OUTOF10: MODERATE PAIN (4)

## 2025-06-30 NOTE — PATIENT INSTRUCTIONS
[facility]  Post Procedure Instructions    Today you had:  trigger point injections   occipital nerve block   bursa injection  Joint Injection    Medications used:  lidocaine   bupivacaine   kenalog   dexamethasone        Go to the emergency room if you develop any shortness of breath  Monitor the injection sites for signs and symptoms of infection-fever, chills, redness, swelling, warmth, or drainage to areas.  You may have soreness at injection sites for up to 24 hours.  It may take up to 14 days for the steroid medication to start working although you may feel the effect as early as a few days after the procedure.     You may apply ice to the painful areas to help minimize the discomfort of the needle pokes.  Do not apply heat to sites for at least 12 hours.  You may use anti-inflammatory medications or Tylenol for pain control if necessary    Pain Clinic phone number during work hours (Monday through Friday 8 am-4:30 pm) at [phone] or the Provider Line after hours at [phone]:

## 2025-07-23 RX ORDER — BUPIVACAINE HYDROCHLORIDE 2.5 MG/ML
10 INJECTION, SOLUTION EPIDURAL; INFILTRATION; INTRACAUDAL; PERINEURAL ONCE
Status: COMPLETED | OUTPATIENT
Start: 2025-06-30 | End: 2025-06-30

## 2025-07-23 RX ORDER — TRIAMCINOLONE ACETONIDE 40 MG/ML
40 INJECTION, SUSPENSION INTRA-ARTICULAR; INTRAMUSCULAR ONCE
Status: COMPLETED | OUTPATIENT
Start: 2025-06-30 | End: 2025-06-30

## 2025-07-24 NOTE — PROGRESS NOTES
Diagnoses and all orders for this visit:  Abdominal wall pain  Myofascial Muscle Pain  Neuropathic pain    Current Procedure: Transversus Abdominis Plane nerve block    Current Indication (include preoperative):  Alleviation of pain      REASON FOR REFERRAL: Chronic abdominal pain.   Sonographic guidance will be used to ensure accurate placement.  PATIENT EDUCATION:  Ready to learn with no apparent learning barriers identified.  Learning preferences include listening. Explained diagnosis and treatment plan as well as treatment alternatives. Patient expressed understanding of the content.  Following denial of allergy and review of potential side effects and complications including but not necessarily limited to infection, bleeding, allergic reaction, post-injection flare, local tissue breakdown, injury to soft tissue and/or nerves and seizure, patient indicated their understanding and agreed to proceed. A written consent was obtained and is scanned into the chart. Written and signed consent obtained and is scanned into the chart.  PROCEDURE:  Prior to the procedure,a 12 MHz linear transducer was used to visualize the abdominal/groin musculature to determine the approach for the procedure.  Procedure was carried out using sterile technique including Chloraprep scrub, a sterile transducer cover, and sterile transducer gel. A simple surgical tray was used.  PROCEDURAL PAUSE:  Procedural pause conducted to verify correct patient identity, procedure to be performed, and as applicable, correct side/site, correct patient position, availability of implants, special equipment, or special requirements.  Patient position:  Supine  Transducer type:  12 MHz linear array transducer  Approach:  Medial to lateral parallel to long axis of transducer  Local Anesthesia:  25 gauge 2.5 inch needle was used to anesthetize the skin, subcutaneous tissue  with 5 ml of 1% Lidocaine  Injection: After confirming needle tip position, syringe was  replaced with one containing 1 ml of 40 mg/ml Depo Medrol and 10 ml of 0.25% Bupivacaine which was injected and seen hydodissecting the fascia between the transversus abdominis muscle and the internal oblique muscle.  Needle was removed bandage placed over the wound.  AFTERCARE:  Patient tolerated the procedure without complication. After a short observation period, the patient was discharged under their own power and in excellent condition.  Thank you  Pain noted to be a 4/10 before completion of the procedure and numb/10 after completion of the procedure.      Injection solution contained:  10ml of 0.5% bupivacaine, and 40mg of kenalog.

## 2025-08-22 ASSESSMENT — ANXIETY QUESTIONNAIRES
7. FEELING AFRAID AS IF SOMETHING AWFUL MIGHT HAPPEN: MORE THAN HALF THE DAYS
GAD7 TOTAL SCORE: 8
7. FEELING AFRAID AS IF SOMETHING AWFUL MIGHT HAPPEN: MORE THAN HALF THE DAYS
5. BEING SO RESTLESS THAT IT IS HARD TO SIT STILL: SEVERAL DAYS
IF YOU CHECKED OFF ANY PROBLEMS ON THIS QUESTIONNAIRE, HOW DIFFICULT HAVE THESE PROBLEMS MADE IT FOR YOU TO DO YOUR WORK, TAKE CARE OF THINGS AT HOME, OR GET ALONG WITH OTHER PEOPLE: SOMEWHAT DIFFICULT
6. BECOMING EASILY ANNOYED OR IRRITABLE: SEVERAL DAYS
8. IF YOU CHECKED OFF ANY PROBLEMS, HOW DIFFICULT HAVE THESE MADE IT FOR YOU TO DO YOUR WORK, TAKE CARE OF THINGS AT HOME, OR GET ALONG WITH OTHER PEOPLE?: SOMEWHAT DIFFICULT
2. NOT BEING ABLE TO STOP OR CONTROL WORRYING: SEVERAL DAYS
4. TROUBLE RELAXING: SEVERAL DAYS
GAD7 TOTAL SCORE: 8
3. WORRYING TOO MUCH ABOUT DIFFERENT THINGS: SEVERAL DAYS
1. FEELING NERVOUS, ANXIOUS, OR ON EDGE: SEVERAL DAYS
GAD7 TOTAL SCORE: 8

## 2025-08-27 ENCOUNTER — NURSE TRIAGE (OUTPATIENT)
Dept: INTERNAL MEDICINE | Facility: CLINIC | Age: 50
End: 2025-08-27

## 2025-08-27 ENCOUNTER — OFFICE VISIT (OUTPATIENT)
Dept: INTERNAL MEDICINE | Facility: CLINIC | Age: 50
End: 2025-08-27
Payer: COMMERCIAL

## 2025-08-27 VITALS
WEIGHT: 269 LBS | DIASTOLIC BLOOD PRESSURE: 70 MMHG | HEART RATE: 96 BPM | HEIGHT: 74 IN | BODY MASS INDEX: 34.52 KG/M2 | SYSTOLIC BLOOD PRESSURE: 130 MMHG | RESPIRATION RATE: 18 BRPM | OXYGEN SATURATION: 96 % | TEMPERATURE: 97.4 F

## 2025-08-27 DIAGNOSIS — F43.23 ADJUSTMENT DISORDER WITH MIXED ANXIETY AND DEPRESSED MOOD: Primary | ICD-10-CM

## 2025-08-27 DIAGNOSIS — F43.21 GRIEF: ICD-10-CM

## 2025-08-27 DIAGNOSIS — R10.12 ABDOMINAL DISCOMFORT IN LEFT UPPER QUADRANT: ICD-10-CM

## 2025-08-27 DIAGNOSIS — R07.89 CHEST DISCOMFORT: ICD-10-CM

## 2025-08-27 PROCEDURE — G2211 COMPLEX E/M VISIT ADD ON: HCPCS | Performed by: INTERNAL MEDICINE

## 2025-08-27 PROCEDURE — 99204 OFFICE O/P NEW MOD 45 MIN: CPT | Performed by: INTERNAL MEDICINE

## 2025-08-27 RX ORDER — EPINEPHRINE 0.3 MG/.3ML
0.3 INJECTION SUBCUTANEOUS
Qty: 2 EACH | Status: CANCELLED | OUTPATIENT
Start: 2025-08-27

## 2025-08-27 ASSESSMENT — PATIENT HEALTH QUESTIONNAIRE - PHQ9: SUM OF ALL RESPONSES TO PHQ QUESTIONS 1-9: 11

## 2025-08-28 ENCOUNTER — PATIENT OUTREACH (OUTPATIENT)
Dept: CARE COORDINATION | Facility: CLINIC | Age: 50
End: 2025-08-28
Payer: COMMERCIAL

## (undated) DEVICE — ESU PENCIL W/HOLSTER E2350H

## (undated) DEVICE — DAVINCI XI ESU FORCE BIPOLAR 8MM 471405

## (undated) DEVICE — GLOVE BIOGEL PI MICRO INDICATOR UNDERGLOVE SZ 7.0 48970

## (undated) DEVICE — SU VICRYL 4-0 PS-2 18" UND J496H

## (undated) DEVICE — DEVICE SUTURE PASSER 14GA WECK EFX EFXSP2

## (undated) DEVICE — LUBRICANT INST ELECTROLUBE EL101

## (undated) DEVICE — GLOVE BIOGEL PI MICRO SZ 6.5 48565

## (undated) DEVICE — SU VICRYL 0 UR-6 27" J603H

## (undated) DEVICE — ENDO POUCH UNIVERSAL RETRIEVAL SYSTEM INZII 5MM CD003

## (undated) DEVICE — ENDO TROCAR FIRST ENTRY KII FIOS Z-THRD 05X100MM CTF03

## (undated) DEVICE — SOL WATER IRRIG 1000ML BOTTLE 2F7114

## (undated) DEVICE — Device

## (undated) DEVICE — SPECIMEN CONTAINER 4OZ

## (undated) DEVICE — DAVINCI XI OBTURATOR BLADELESS 8MM 470359

## (undated) DEVICE — CLIP ENDO HEMO-LOC GREEN MED/LG 544230

## (undated) DEVICE — DAVINCI XI SEAL UNIVERSAL 5-8MM 470361

## (undated) DEVICE — ESU ELEC BLADE 2.75" COATED/INSULATED E1455

## (undated) DEVICE — BLADE KNIFE SURG 11 371111

## (undated) DEVICE — DAVINCI XI DRAPE ARM 470015

## (undated) DEVICE — DAVINCI XI DRAPE COLUMN 470341

## (undated) DEVICE — ESU GROUND PAD ADULT W/CORD E7507

## (undated) RX ORDER — ONDANSETRON 2 MG/ML
INJECTION INTRAMUSCULAR; INTRAVENOUS
Status: DISPENSED
Start: 2024-05-30

## (undated) RX ORDER — FENTANYL CITRATE 50 UG/ML
INJECTION, SOLUTION INTRAMUSCULAR; INTRAVENOUS
Status: DISPENSED
Start: 2024-05-30

## (undated) RX ORDER — KETOROLAC TROMETHAMINE 30 MG/ML
INJECTION, SOLUTION INTRAMUSCULAR; INTRAVENOUS
Status: DISPENSED
Start: 2024-05-30

## (undated) RX ORDER — ACETAMINOPHEN 325 MG/1
TABLET ORAL
Status: DISPENSED
Start: 2024-05-30

## (undated) RX ORDER — NEOSTIGMINE METHYLSULFATE 1 MG/ML
VIAL (ML) INJECTION
Status: DISPENSED
Start: 2024-05-30

## (undated) RX ORDER — PROPOFOL 10 MG/ML
INJECTION, EMULSION INTRAVENOUS
Status: DISPENSED
Start: 2024-05-30

## (undated) RX ORDER — BUPIVACAINE HYDROCHLORIDE 5 MG/ML
INJECTION, SOLUTION EPIDURAL; INTRACAUDAL
Status: DISPENSED
Start: 2024-05-30

## (undated) RX ORDER — INDOCYANINE GREEN AND WATER 25 MG
KIT INJECTION
Status: DISPENSED
Start: 2024-05-30

## (undated) RX ORDER — OXYCODONE HYDROCHLORIDE 5 MG/1
TABLET ORAL
Status: DISPENSED
Start: 2024-05-30

## (undated) RX ORDER — DEXAMETHASONE SODIUM PHOSPHATE 4 MG/ML
INJECTION, SOLUTION INTRA-ARTICULAR; INTRALESIONAL; INTRAMUSCULAR; INTRAVENOUS; SOFT TISSUE
Status: DISPENSED
Start: 2024-05-30

## (undated) RX ORDER — FENTANYL CITRATE-0.9 % NACL/PF 10 MCG/ML
PLASTIC BAG, INJECTION (ML) INTRAVENOUS
Status: DISPENSED
Start: 2024-05-30

## (undated) RX ORDER — GLYCOPYRROLATE 0.2 MG/ML
INJECTION, SOLUTION INTRAMUSCULAR; INTRAVENOUS
Status: DISPENSED
Start: 2024-05-30

## (undated) RX ORDER — CEFAZOLIN SODIUM/WATER 2 G/20 ML
SYRINGE (ML) INTRAVENOUS
Status: DISPENSED
Start: 2024-05-30